# Patient Record
Sex: FEMALE | Race: WHITE | Employment: FULL TIME | URBAN - METROPOLITAN AREA
[De-identification: names, ages, dates, MRNs, and addresses within clinical notes are randomized per-mention and may not be internally consistent; named-entity substitution may affect disease eponyms.]

---

## 2017-02-09 ENCOUNTER — HOSPITAL ENCOUNTER (OUTPATIENT)
Dept: MAMMOGRAPHY | Age: 52
Discharge: HOME | End: 2017-02-09
Attending: FAMILY MEDICINE
Payer: COMMERCIAL

## 2017-02-09 DIAGNOSIS — Z12.31 VISIT FOR SCREENING MAMMOGRAM: ICD-10-CM

## 2017-02-09 PROCEDURE — 77067 SCR MAMMO BI INCL CAD: CPT

## 2018-02-20 ENCOUNTER — HOSPITAL ENCOUNTER (OUTPATIENT)
Dept: MAMMOGRAPHY | Age: 53
Discharge: HOME | End: 2018-02-20
Payer: COMMERCIAL

## 2018-02-20 DIAGNOSIS — Z12.31 VISIT FOR SCREENING MAMMOGRAM: ICD-10-CM

## 2018-02-20 PROCEDURE — 77067 SCR MAMMO BI INCL CAD: CPT

## 2019-02-22 ENCOUNTER — HOSPITAL ENCOUNTER (OUTPATIENT)
Dept: MAMMOGRAPHY | Age: 54
Discharge: HOME | End: 2019-02-22
Attending: FAMILY MEDICINE
Payer: COMMERCIAL

## 2019-02-22 DIAGNOSIS — Z12.31 VISIT FOR SCREENING MAMMOGRAM: ICD-10-CM

## 2019-02-22 PROCEDURE — 77067 SCR MAMMO BI INCL CAD: CPT

## 2021-03-12 ENCOUNTER — TRANSCRIBE ORDER (OUTPATIENT)
Dept: SCHEDULING | Age: 56
End: 2021-03-12

## 2021-03-12 DIAGNOSIS — Z12.31 SCREENING MAMMOGRAM FOR HIGH-RISK PATIENT: Primary | ICD-10-CM

## 2022-03-16 ENCOUNTER — TRANSCRIBE ORDER (OUTPATIENT)
Dept: SCHEDULING | Age: 57
End: 2022-03-16

## 2022-03-16 DIAGNOSIS — Z12.31 OTHER SCREENING MAMMOGRAM: Primary | ICD-10-CM

## 2022-04-13 ENCOUNTER — HOSPITAL ENCOUNTER (OUTPATIENT)
Dept: MAMMOGRAPHY | Age: 57
Discharge: HOME | End: 2022-04-13
Attending: FAMILY MEDICINE
Payer: COMMERCIAL

## 2022-04-13 DIAGNOSIS — Z12.31 OTHER SCREENING MAMMOGRAM: ICD-10-CM

## 2022-04-13 PROCEDURE — 77063 BREAST TOMOSYNTHESIS BI: CPT

## 2022-07-19 ENCOUNTER — OFFICE VISIT (OUTPATIENT)
Dept: OCCUPATIONAL MEDICINE | Age: 57
End: 2022-07-19

## 2022-07-19 VITALS — HEART RATE: 90 BPM | DIASTOLIC BLOOD PRESSURE: 64 MMHG | SYSTOLIC BLOOD PRESSURE: 118 MMHG

## 2022-07-19 DIAGNOSIS — Z01.30 BLOOD PRESSURE CHECK: Primary | ICD-10-CM

## 2022-07-19 PROBLEM — K21.00 GASTROESOPHAGEAL REFLUX DISEASE WITH ESOPHAGITIS WITHOUT HEMORRHAGE: Status: ACTIVE | Noted: 2022-07-19

## 2022-07-19 PROBLEM — E03.9 ACQUIRED HYPOTHYROIDISM: Status: ACTIVE | Noted: 2022-07-19

## 2022-07-19 PROBLEM — I10 PRIMARY HYPERTENSION: Status: ACTIVE | Noted: 2022-07-19

## 2022-07-19 PROBLEM — F32.9 MAJOR DEPRESSIVE DISORDER: Status: ACTIVE | Noted: 2022-07-19

## 2022-07-19 PROCEDURE — 99212 OFFICE O/P EST SF 10 MIN: CPT

## 2022-07-19 RX ORDER — LEVOTHYROXINE SODIUM 0.05 MG/1
50 TABLET ORAL DAILY
COMMUNITY

## 2022-07-19 RX ORDER — LISINOPRIL 20 MG/1
20 TABLET ORAL DAILY
COMMUNITY
End: 2022-07-21 | Stop reason: SDUPTHER

## 2022-07-19 RX ORDER — OMEPRAZOLE 20 MG/1
20 CAPSULE, DELAYED RELEASE ORAL DAILY
COMMUNITY

## 2022-07-19 RX ORDER — ESCITALOPRAM OXALATE 20 MG/1
20 TABLET ORAL DAILY
COMMUNITY
End: 2022-09-29

## 2022-07-19 NOTE — PROGRESS NOTES
PROGRESS NOTE    SUBJECTIVE:   King Hernandez is a 49 y.o. female seen for blood pressure check. Denies any other needs or concerns at this time. Current Outpatient Medications   Medication Sig Dispense Refill    omeprazole (PRILOSEC) 20 MG delayed release capsule Take 20 mg by mouth in the morning. lisinopril (PRINIVIL;ZESTRIL) 20 MG tablet Take 20 mg by mouth in the morning. levothyroxine (SYNTHROID) 50 MCG tablet Take 50 mcg by mouth in the morning. escitalopram (LEXAPRO) 20 MG tablet Take 20 mg by mouth in the morning. No current facility-administered medications for this visit. Allergies   Allergen Reactions    Bromfed Dm [[id number]] Other (See Comments)     jitters     Social History     Tobacco Use    Smoking status: Former     Types: Cigarettes    Smokeless tobacco: Not on file   Substance Use Topics    Alcohol use: Not Currently              OBJECTIVE:  /64 (Site: Left Upper Arm, Position: Sitting, Cuff Size: Large Adult)   Pulse 90          ASSESSMENT and PLAN    Diagnoses and all orders for this visit:    Blood pressure check    Keep next scheduled pcp appointment. Counseled on benefits of having a primary care provider which includes, but is not limited to, continuity of care and having a medical home when concerns arise. Also enforced that onsite clinic policy states that we are not to take the place of a primary care provider, pt verbalized understanding. SEs and risk vs benefits associated with medications prescribed discussed with patient who verbalized understanding. Pt verbalized understanding and agreement with plan of care. RTC for persisting/worsening symptoms or new complaints that arise.  Discussed signs and symptoms that would warrant immediate evaluation including, but not limited to HA, blurred vision, speech disturbance, difficulty with ambulation/gait, numbness, tingling, weakness, syncope, chest pain, or shortness of breath. I have reviewed the patient's medication list, past medical, family, social, and surgical history in detail and updated the patient record appropriately. No follow-up provider specified.   Ollie Clark

## 2022-07-21 ENCOUNTER — OFFICE VISIT (OUTPATIENT)
Dept: OCCUPATIONAL MEDICINE | Age: 57
End: 2022-07-21

## 2022-07-21 VITALS — HEART RATE: 96 BPM | SYSTOLIC BLOOD PRESSURE: 130 MMHG | OXYGEN SATURATION: 96 % | DIASTOLIC BLOOD PRESSURE: 80 MMHG

## 2022-07-21 DIAGNOSIS — I10 PRIMARY HYPERTENSION: Primary | ICD-10-CM

## 2022-07-21 DIAGNOSIS — Z76.0 MEDICATION REFILL: ICD-10-CM

## 2022-07-21 PROCEDURE — 99212 OFFICE O/P EST SF 10 MIN: CPT

## 2022-07-21 RX ORDER — LISINOPRIL 20 MG/1
20 TABLET ORAL DAILY
Qty: 90 TABLET | Refills: 0 | Status: SHIPPED | OUTPATIENT
Start: 2022-07-21 | End: 2022-10-19

## 2022-07-21 NOTE — PROGRESS NOTES
PROGRESS NOTE    SUBJECTIVE:   Mercedes Kraus is a 49 y.o. female seen for medication refill and blood pressure check. No complaints at this time. Current Outpatient Medications   Medication Sig Dispense Refill    lisinopril (PRINIVIL;ZESTRIL) 20 MG tablet Take 1 tablet by mouth in the morning. 90 tablet 0    omeprazole (PRILOSEC) 20 MG delayed release capsule Take 20 mg by mouth in the morning. levothyroxine (SYNTHROID) 50 MCG tablet Take 50 mcg by mouth in the morning. escitalopram (LEXAPRO) 20 MG tablet Take 20 mg by mouth in the morning. No current facility-administered medications for this visit. Allergies   Allergen Reactions    Bromfed Dm [[id number]] Other (See Comments)     jitters     Social History     Tobacco Use    Smoking status: Former     Types: Cigarettes    Smokeless tobacco: Not on file   Substance Use Topics    Alcohol use: Not Currently       Review of Systems       OBJECTIVE:  /80 (Site: Left Upper Arm, Position: Sitting, Cuff Size: Large Adult)   Pulse 96   SpO2 96%      Physical Exam    ASSESSMENT and PLAN    Diagnoses and all orders for this visit:    Primary hypertension  -     lisinopril (PRINIVIL;ZESTRIL) 20 MG tablet; Take 1 tablet by mouth in the morning. Medication refill  Keep appointment with your PCP. Counseled on benefits of having a primary care provider which includes, but is not limited to, continuity of care and having a medical home when concerns arise. Also enforced that onsite clinic policy states that we are not to take the place of a primary care provider, pt verbalized understanding. SEs and risk vs benefits associated with medications prescribed discussed with patient who verbalized understanding. Pt verbalized understanding and agreement with plan of care. RTC for persisting/worsening symptoms or new complaints that arise.  Discussed signs and symptoms that would warrant immediate evaluation including, but not limited to HA, blurred vision, speech disturbance, difficulty with ambulation/gait, numbness, tingling, weakness, syncope, chest pain, or shortness of breath. I have reviewed the patient's medication list, past medical, family, social, and surgical history in detail and updated the patient record appropriately. No follow-up provider specified.   Abram Pace

## 2022-09-06 ENCOUNTER — OFFICE VISIT (OUTPATIENT)
Dept: OCCUPATIONAL MEDICINE | Age: 57
End: 2022-09-06

## 2022-09-06 DIAGNOSIS — Z79.899 MEDICATION CHANGED TO THERAPEUTIC EQUIVALENT: ICD-10-CM

## 2022-09-06 RX ORDER — SERTRALINE HYDROCHLORIDE 25 MG/1
25 TABLET, FILM COATED ORAL DAILY
Qty: 30 TABLET | Refills: 0 | Status: SHIPPED | OUTPATIENT
Start: 2022-09-06 | End: 2022-09-29

## 2022-09-29 ENCOUNTER — OFFICE VISIT (OUTPATIENT)
Dept: OCCUPATIONAL MEDICINE | Age: 57
End: 2022-09-29

## 2022-09-29 VITALS — DIASTOLIC BLOOD PRESSURE: 72 MMHG | OXYGEN SATURATION: 97 % | HEART RATE: 97 BPM | SYSTOLIC BLOOD PRESSURE: 118 MMHG

## 2022-09-29 DIAGNOSIS — F32.A ANXIETY AND DEPRESSION: Primary | ICD-10-CM

## 2022-09-29 DIAGNOSIS — F41.9 ANXIETY AND DEPRESSION: Primary | ICD-10-CM

## 2022-09-29 ASSESSMENT — ANXIETY QUESTIONNAIRES
5. BEING SO RESTLESS THAT IT IS HARD TO SIT STILL: 2
2. NOT BEING ABLE TO STOP OR CONTROL WORRYING: 0
4. TROUBLE RELAXING: 2
GAD7 TOTAL SCORE: 8
1. FEELING NERVOUS, ANXIOUS, OR ON EDGE: 1
7. FEELING AFRAID AS IF SOMETHING AWFUL MIGHT HAPPEN: 0
3. WORRYING TOO MUCH ABOUT DIFFERENT THINGS: 0
6. BECOMING EASILY ANNOYED OR IRRITABLE: 3

## 2022-09-29 ASSESSMENT — PATIENT HEALTH QUESTIONNAIRE - PHQ9
SUM OF ALL RESPONSES TO PHQ QUESTIONS 1-9: 2
SUM OF ALL RESPONSES TO PHQ9 QUESTIONS 1 & 2: 2
2. FEELING DOWN, DEPRESSED OR HOPELESS: 0
1. LITTLE INTEREST OR PLEASURE IN DOING THINGS: 2
SUM OF ALL RESPONSES TO PHQ QUESTIONS 1-9: 2

## 2022-09-29 ASSESSMENT — ENCOUNTER SYMPTOMS: SHORTNESS OF BREATH: 0

## 2022-09-29 NOTE — PROGRESS NOTES
PROGRESS NOTE    SUBJECTIVE:   Anastacio Mendoza is a 49 y.o. female seen requesting that her sertraline be increased. She states that she still feels \"on edge at times at [facility]". Denies any SI/HI. Denies any complaints at this time. Anxiety  Presents for follow-up visit. Symptoms include irritability and restlessness. Patient reports no dry mouth, excessive worry or shortness of breath. Symptoms occur most days. The severity of symptoms is moderate. The quality of sleep is good. Nighttime awakenings: occasional.     Compliance with medications is %. Current Outpatient Medications   Medication Sig Dispense Refill    sertraline (ZOLOFT) 25 MG tablet Take 1 tablet by mouth daily 30 tablet 0    lisinopril (PRINIVIL;ZESTRIL) 20 MG tablet Take 1 tablet by mouth in the morning. 90 tablet 0    omeprazole (PRILOSEC) 20 MG delayed release capsule Take 20 mg by mouth in the morning. levothyroxine (SYNTHROID) 50 MCG tablet Take 50 mcg by mouth in the morning. escitalopram (LEXAPRO) 20 MG tablet Take 20 mg by mouth in the morning. No current facility-administered medications for this visit. Allergies   Allergen Reactions    Bromfed Dm [[id number]] Other (See Comments)     jitters     Social History     Tobacco Use    Smoking status: Former     Types: Cigarettes    Smokeless tobacco: Not on file   Substance Use Topics    Alcohol use: Not Currently       Review of Systems   Constitutional:  Positive for irritability. Respiratory:  Negative for shortness of breath. OBJECTIVE:  /72 (Site: Left Upper Arm, Position: Sitting, Cuff Size: Large Adult)   Pulse 97   SpO2 97%    PHQ-9 Total Score: 2 (9/29/2022  8:54 AM). ANNA MARIE 7 SCORE 9/29/2022   ANNA MARIE-7 Total Score 8     Interpretation of ANNA MARIE-7 score: 5-9 = mild anxiety, 10-14 = moderate anxiety, 15+ = severe anxiety. Recommend referral to behavioral health for scores 10 or greater.      ASSESSMENT and PLAN    Diagnoses and all orders for this visit:    Anxiety and depression    Increase patients dose of Sertraline and will reassess in one to two weeks. Follow up as needed or in 1-2 weeks for evaluation. Counseled on benefits of having a primary care provider which includes, but is not limited to, continuity of care and having a medical home when concerns arise. Also enforced that onsite clinic policy states that we are not to take the place of a primary care provider, pt verbalized understanding. SEs and risk vs benefits associated with medications prescribed discussed with patient who verbalized understanding. Pt verbalized understanding and agreement with plan of care. RTC for persisting/worsening symptoms or new complaints that arise. Discussed signs and symptoms that would warrant immediate evaluation including, but not limited to HA, blurred vision, speech disturbance, difficulty with ambulation/gait, numbness, tingling, weakness, syncope, chest pain, or shortness of breath. I have reviewed the patient's medication list, past medical, family, social, and surgical history in detail and updated the patient record appropriately. No follow-up provider specified.     Jesse Shin NP

## 2022-10-04 ENCOUNTER — OFFICE VISIT (OUTPATIENT)
Dept: OCCUPATIONAL MEDICINE | Age: 57
End: 2022-10-04

## 2022-10-04 VITALS — HEART RATE: 97 BPM | OXYGEN SATURATION: 97 % | TEMPERATURE: 98.1 F

## 2022-10-04 DIAGNOSIS — H65.01 NON-RECURRENT ACUTE SEROUS OTITIS MEDIA OF RIGHT EAR: ICD-10-CM

## 2022-10-04 DIAGNOSIS — H65.93 MIDDLE EAR EFFUSION, BILATERAL: Primary | ICD-10-CM

## 2022-10-04 ASSESSMENT — ENCOUNTER SYMPTOMS
DIARRHEA: 0
SHORTNESS OF BREATH: 0
RHINORRHEA: 0
SINUS PAIN: 0
WHEEZING: 0
APNEA: 0
SINUS PRESSURE: 0
SORE THROAT: 0
VOMITING: 0

## 2022-10-04 NOTE — PROGRESS NOTES
PROGRESS NOTE    SUBJECTIVE:   Shamar Mejia is a 49 y.o. female seen for ear pain/ ear pressure in right ear that has been occurring for the last two days. States she feels pain most when laying down. She has tried zyrtec to help with allergies. Denies any other otc medications at this time. Denies fever, h/a, sore throat, N/V/D, fatigue, or SOB. Otalgia   There is pain in the right ear. This is a new problem. The current episode started in the past 7 days. The problem occurs every few hours. The problem has been unchanged. There has been no fever. The pain is mild. Pertinent negatives include no diarrhea, ear discharge, headaches, hearing loss, neck pain, rash, rhinorrhea, sore throat or vomiting. Treatments tried: antihistamine. The treatment provided no relief. There is no history of a chronic ear infection, hearing loss or a tympanostomy tube. 10/6/22- Patient seen today for increased pain (8/10) in right ear that extends . States that she could not afford the other medication that was ordered. She tried otc ear pain ease medication last night but states that it did not help. She ask for recheck of ear. Denies fever, SOB, headache. She is currently experiencing pain and nausea. Current Outpatient Medications   Medication Sig Dispense Refill    amoxicillin (AMOXIL) 500 MG capsule Take 1 capsule by mouth 2 times daily for 7 days 14 capsule 0    hydrOXYzine HCl (ATARAX) 25 MG tablet Take 1 tablet by mouth every 8 hours as needed for Itching 30 tablet 0    ipratropium (ATROVENT HFA) 17 MCG/ACT inhaler Inhale 1 puff into the lungs 3 times daily 1 each 3    sertraline (ZOLOFT) 50 MG tablet Take 1 tablet by mouth daily 90 tablet 1    lisinopril (PRINIVIL;ZESTRIL) 20 MG tablet Take 1 tablet by mouth in the morning. 90 tablet 0    omeprazole (PRILOSEC) 20 MG delayed release capsule Take 20 mg by mouth in the morning.       levothyroxine (SYNTHROID) 50 MCG tablet Take 50 mcg by mouth in the morning. No current facility-administered medications for this visit. Allergies   Allergen Reactions    Bromfed Dm [[id number]] Other (See Comments)     jitters     Social History     Tobacco Use    Smoking status: Former     Types: Cigarettes    Smokeless tobacco: Not on file   Substance Use Topics    Alcohol use: Not Currently       Review of Systems   Constitutional:  Negative for fatigue and fever. HENT:  Positive for ear pain. Negative for ear discharge, hearing loss, rhinorrhea, sinus pressure, sinus pain, sneezing and sore throat. Respiratory:  Negative for apnea, shortness of breath and wheezing. Gastrointestinal:  Negative for diarrhea and vomiting. Musculoskeletal:  Negative for neck pain. Skin:  Negative for rash. Allergic/Immunologic: Positive for environmental allergies. Negative for immunocompromised state. Neurological:  Negative for dizziness, light-headedness, numbness and headaches. OBJECTIVE:  Pulse 97   Temp 98.1 °F (36.7 °C)   SpO2 97%      Physical Exam  Constitutional:       Appearance: Normal appearance. She is well-developed and well-groomed. She is not ill-appearing. HENT:      Head: Normocephalic. Right Ear: No tenderness. Tympanic membrane is erythematous and bulging. Tympanic membrane is not injected or retracted. Left Ear: No tenderness. A middle ear effusion is present. Tympanic membrane is not injected, erythematous, retracted or bulging. Nose: Nose normal.      Right Turbinates: Not enlarged or swollen. Left Turbinates: Swollen. Mouth/Throat:      Mouth: Mucous membranes are moist.      Pharynx: No oropharyngeal exudate or posterior oropharyngeal erythema. Cardiovascular:      Rate and Rhythm: Normal rate and regular rhythm. Pulmonary:      Effort: Pulmonary effort is normal.      Breath sounds: Normal breath sounds and air entry.    Lymphadenopathy:      Head:      Right side of head: Submental and submandibular adenopathy present. No tonsillar or preauricular adenopathy. Left side of head: No submental, submandibular, tonsillar or preauricular adenopathy. Cervical:      Right cervical: No superficial cervical adenopathy. Left cervical: No superficial cervical adenopathy. Upper Body:      Right upper body: No supraclavicular adenopathy. Left upper body: No supraclavicular adenopathy. Skin:     General: Skin is warm and dry. Neurological:      Mental Status: She is alert and oriented to person, place, and time. Psychiatric:         Mood and Affect: Mood normal.         Behavior: Behavior normal. Behavior is cooperative. ASSESSMENT and PLAN    Martina Brower was seen today for otalgia. Diagnoses and all orders for this visit:    Middle ear effusion, bilateral  -     ipratropium (ATROVENT HFA) 17 MCG/ACT inhaler; Inhale 1 puff into the lungs 3 times daily  -     hydrOXYzine HCl (ATARAX) 25 MG tablet; Take 1 tablet by mouth every 8 hours as needed for Itching    Non-recurrent acute serous otitis media of right ear  -     amoxicillin (AMOXIL) 500 MG capsule; Take 1 capsule by mouth 2 times daily for 7 days      ADDEND 10/6/22    Tiarra Mcleod all of the prescribed medication even if you start to feel better. ONLY take the atarax when you will be at home, not driving or not operating heavy machinery. Since Atrovent was unattainable, continue to use Flonase daily. May use warm compress on ear to ease pain. May also use ibuprofen 600 mg 3x a day for pain control. Return to clinic next week for recheck. -----------------------------------------------------------------------------------------------------------------------------------------------------------------------------------------------------------------------------------------------------------  Advised patient to use Atrovent and Flonase and continue using antihistamine of choice 2nd generation.    Discussed need to avoid/limit exposure to allergens. Education: Change air filter monthly. Wear mask while doing yard work. Keep pets off bed during bedtime hours. Vacuum carpet weekly. Leave windows closed. Use Nasal spray after showering. Follow in 2 weeks with PCP or here if symptoms not any better or worse. Patient voices understanding and agrees with the plan of care described above    Counseled on benefits of having a primary care provider which includes, but is not limited to, continuity of care and having a medical home when concerns arise. Also enforced that onsite clinic policy states that we are not to take the place of a primary care provider, pt verbalized understanding. SEs and risk vs benefits associated with medications prescribed discussed with patient who verbalized understanding. Pt verbalized understanding and agreement with plan of care. RTC for persisting/worsening symptoms or new complaints that arise. Discussed signs and symptoms that would warrant immediate evaluation including, but not limited to HA, blurred vision, speech disturbance, difficulty with ambulation/gait, numbness, tingling, weakness, syncope, chest pain, or shortness of breath. I have reviewed the patient's medication list, past medical, family, social, and surgical history in detail and updated the patient record appropriately. No follow-up provider specified.     Shaan Hill NP

## 2022-10-06 RX ORDER — AMOXICILLIN 500 MG/1
500 CAPSULE ORAL 2 TIMES DAILY
Qty: 14 CAPSULE | Refills: 0 | Status: SHIPPED | OUTPATIENT
Start: 2022-10-06 | End: 2022-10-13

## 2022-10-06 RX ORDER — HYDROXYZINE HYDROCHLORIDE 25 MG/1
25 TABLET, FILM COATED ORAL EVERY 8 HOURS PRN
Qty: 30 TABLET | Refills: 0 | Status: SHIPPED | OUTPATIENT
Start: 2022-10-06 | End: 2022-11-05

## 2022-10-06 ASSESSMENT — ENCOUNTER SYMPTOMS
WHEEZING: 0
SINUS PAIN: 0
RHINORRHEA: 0
SHORTNESS OF BREATH: 0
SINUS PRESSURE: 0
VOMITING: 0
SORE THROAT: 0
DIARRHEA: 0
APNEA: 0

## 2022-10-18 DIAGNOSIS — Z71.6 ENCOUNTER FOR SMOKING CESSATION COUNSELING: Primary | ICD-10-CM

## 2022-10-18 RX ORDER — VARENICLINE TARTRATE 1 MG/1
TABLET, FILM COATED ORAL
COMMUNITY
Start: 2022-08-20 | End: 2022-10-18 | Stop reason: SDUPTHER

## 2022-10-18 RX ORDER — VARENICLINE TARTRATE 1 MG/1
1 TABLET, FILM COATED ORAL DAILY
Qty: 60 TABLET | Refills: 0 | Status: SHIPPED | OUTPATIENT
Start: 2022-10-18

## 2022-10-18 NOTE — TELEPHONE ENCOUNTER
Patient states that she is having a difficult time trying not to smoke due to stressful family situation at this time. She is requesting that her varenicline be refilled. She denies ever having side effects from this medication in past. Denies suicidal/homicidal ideations current or past. Denies current or hx of seizures. Instructed to RTC weekly for reevaluation.

## 2022-11-22 ENCOUNTER — OFFICE VISIT (OUTPATIENT)
Dept: OCCUPATIONAL MEDICINE | Age: 57
End: 2022-11-22

## 2022-11-22 DIAGNOSIS — K21.00 GASTROESOPHAGEAL REFLUX DISEASE WITH ESOPHAGITIS WITHOUT HEMORRHAGE: ICD-10-CM

## 2022-11-22 DIAGNOSIS — Z76.0 MEDICATION REFILL: Primary | ICD-10-CM

## 2022-11-22 RX ORDER — OMEPRAZOLE 20 MG/1
20 CAPSULE, DELAYED RELEASE ORAL DAILY
Qty: 90 CAPSULE | Refills: 1 | Status: SHIPPED | OUTPATIENT
Start: 2022-11-22

## 2022-11-22 ASSESSMENT — ENCOUNTER SYMPTOMS
CHOKING: 1
BELCHING: 1
COUGH: 1
HEARTBURN: 1

## 2022-11-22 NOTE — PROGRESS NOTES
PROGRESS NOTE    SUBJECTIVE:   Jerry Huang is a 49 y.o. female seen for medication refill for her omeprazole. She was supposed to have MD appointment last week but had to cancel due to death in family. Denies any other needs at this time. Gastroesophageal Reflux  She complains of belching, choking, coughing and heartburn. This is a chronic problem. The current episode started more than 1 year ago. The problem occurs frequently. The heartburn is located in the abdomen. The heartburn is of mild intensity. The heartburn does not wake her from sleep. The heartburn does not limit her activity. The heartburn doesn't change with position. The symptoms are aggravated by certain foods, bending, stress and lying down. Pertinent negatives include no anemia, fatigue, melena, muscle weakness, orthopnea or weight loss. Risk factors include lack of exercise, obesity and smoking/tobacco exposure. She has tried an antacid, head elevation and a PPI for the symptoms. The treatment provided moderate relief. Past procedures do not include an abdominal ultrasound, an EGD, esophageal manometry, esophageal pH monitoring, H. pylori antibody titer or a UGI. Past invasive treatments do not include gastroplasty, gastroplication or reflux surgery. Current Outpatient Medications   Medication Sig Dispense Refill    omeprazole (PRILOSEC) 20 MG delayed release capsule Take 1 capsule by mouth daily 90 capsule 1    varenicline (CHANTIX) 1 MG tablet Take 1 tablet by mouth daily 60 tablet 0    ipratropium (ATROVENT HFA) 17 MCG/ACT inhaler Inhale 1 puff into the lungs 3 times daily 1 each 3    sertraline (ZOLOFT) 50 MG tablet Take 1 tablet by mouth daily 90 tablet 1    lisinopril (PRINIVIL;ZESTRIL) 20 MG tablet Take 1 tablet by mouth in the morning. 90 tablet 0    levothyroxine (SYNTHROID) 50 MCG tablet Take 50 mcg by mouth in the morning. No current facility-administered medications for this visit.      Allergies   Allergen Reactions    Bromfed Dm [[id number]] Other (See Comments)     jitters     Social History     Tobacco Use    Smoking status: Former     Types: Cigarettes    Smokeless tobacco: Not on file   Substance Use Topics    Alcohol use: Not Currently       Review of Systems   Constitutional: Negative. Negative for fatigue and weight loss. HENT: Negative. Respiratory:  Positive for cough and choking. Gastrointestinal:  Positive for heartburn. Negative for melena. Musculoskeletal: Negative. Negative for muscle weakness. Skin: Negative. Allergic/Immunologic: Positive for environmental allergies. Neurological: Negative. Hematological: Negative. Physical Exam  Constitutional:       Appearance: Normal appearance. She is well-developed and well-groomed. She is not ill-appearing. HENT:      Head: Normocephalic. Pulmonary:      Effort: Pulmonary effort is normal.   Abdominal:      General: Abdomen is flat. Bowel sounds are normal. There is no distension. Palpations: Abdomen is soft. There is no mass. Tenderness: There is no abdominal tenderness. There is no guarding or rebound. Hernia: No hernia is present. Skin:     General: Skin is warm and dry. Neurological:      Mental Status: She is alert and oriented to person, place, and time. Psychiatric:         Mood and Affect: Mood normal.         Behavior: Behavior normal. Behavior is cooperative. ASSESSMENT and PLAN    Diagnoses and all orders for this visit:    Medication refill    Gastroesophageal reflux disease with esophagitis without hemorrhage  -     omeprazole (PRILOSEC) 20 MG delayed release capsule; Take 1 capsule by mouth daily  Side effects and risks vs benefits associated with medication discussed with pt and pt reports understanding.   - Continue all meds as prescribed. Keep all apt with PCP.     -RTC as needed for complaints or concerns that may arise    Counseled on benefits of having a primary care provider which includes, but is not limited to, continuity of care and having a medical home when concerns arise. Also enforced that onsite clinic policy states that we are not to take the place of a primary care provider, pt verbalized understanding. SEs and risk vs benefits associated with medications prescribed discussed with patient who verbalized understanding. Pt verbalized understanding and agreement with plan of care. RTC for persisting/worsening symptoms or new complaints that arise. Discussed signs and symptoms that would warrant immediate evaluation including, but not limited to HA, blurred vision, speech disturbance, difficulty with ambulation/gait, numbness, tingling, weakness, syncope, chest pain, or shortness of breath. I have reviewed the patient's medication list, past medical, family, social, and surgical history in detail and updated the patient record appropriately. No follow-up provider specified.     Cece Briggs NP

## 2022-12-15 ENCOUNTER — OFFICE VISIT (OUTPATIENT)
Dept: OCCUPATIONAL MEDICINE | Age: 57
End: 2022-12-15

## 2022-12-15 DIAGNOSIS — Z71.6 ENCOUNTER FOR SMOKING CESSATION COUNSELING: ICD-10-CM

## 2022-12-15 DIAGNOSIS — I10 HYPERTENSION, UNSPECIFIED TYPE: ICD-10-CM

## 2022-12-15 DIAGNOSIS — Z76.0 ENCOUNTER FOR MEDICATION REFILL: Primary | ICD-10-CM

## 2022-12-15 RX ORDER — VARENICLINE TARTRATE 1 MG/1
1 TABLET, FILM COATED ORAL DAILY
Qty: 60 TABLET | Refills: 0 | Status: SHIPPED | OUTPATIENT
Start: 2022-12-15

## 2022-12-15 RX ORDER — HYDROCHLOROTHIAZIDE 25 MG/1
TABLET ORAL
COMMUNITY
Start: 2022-09-24 | End: 2022-12-15 | Stop reason: SDUPTHER

## 2022-12-15 RX ORDER — HYDROCHLOROTHIAZIDE 25 MG/1
25 TABLET ORAL DAILY
Qty: 30 TABLET | Refills: 1 | Status: SHIPPED | OUTPATIENT
Start: 2022-12-15

## 2022-12-15 NOTE — PROGRESS NOTES
PROGRESS NOTE    SUBJECTIVE:   Demetrio Nielsen is a 49 y.o. female seen for medication refill. Pt presents for a refill for Chantix for the expanded dosing - not the initial dosing. Reports that she feels if the dosing is stronger she can actually quit. Denies any relief with nicotine patches. Denies suicidal/homicidal ideations current or past. Denies current or hx of seizures. States that she has smoked for years and needs help to quit and to stay quit. Patient is also in need of her hydrochlorothiazide refill. States that she will return next week for labs. Current Outpatient Medications   Medication Sig Dispense Refill    hydroCHLOROthiazide (HYDRODIURIL) 25 MG tablet       omeprazole (PRILOSEC) 20 MG delayed release capsule Take 1 capsule by mouth daily 90 capsule 1    varenicline (CHANTIX) 1 MG tablet Take 1 tablet by mouth daily 60 tablet 0    ipratropium (ATROVENT HFA) 17 MCG/ACT inhaler Inhale 1 puff into the lungs 3 times daily 1 each 3    sertraline (ZOLOFT) 50 MG tablet Take 1 tablet by mouth daily 90 tablet 1    lisinopril (PRINIVIL;ZESTRIL) 20 MG tablet Take 1 tablet by mouth in the morning. 90 tablet 0    levothyroxine (SYNTHROID) 50 MCG tablet Take 50 mcg by mouth in the morning. No current facility-administered medications for this visit. Allergies   Allergen Reactions    Bromfed Dm [[id number]] Other (See Comments)     jitters     Social History     Tobacco Use    Smoking status: Former     Types: Cigarettes    Smokeless tobacco: Not on file   Substance Use Topics    Alcohol use: Not Currently     Past Medical History:   Diagnosis Date    Menopause     @51      No past surgical history on file. ASSESSMENT and PLAN    There are no diagnoses linked to this encounter. O:  Pt A&Ox4, NAD, Pleasant, calm engaging in conversation. Appropriate mood and affect for situation.  TTM stage of change: action  A: Smoking Cessation   Encounter for health education  P: Discussed options for smoking cessation. Varenicline 1mg take 1 po BID daily; disp 60; 1RF. Discussed with patient that it may take as long as 16 weeks of medication for  smoking cessation success. Discussed reasons to seek emergency medical attention to include: CP/SOB/ homicidal or suicidal ideations. Patient verbalized understanding  Discussed risks vs benefits of medication as well as side effects to include changes in mood, insomnia, hallucinations, dry mouth, and headache, etc.  Discussed the need for behavioral management counseling along with medication for best success. Instructed to RTC weekly for reevaluation. Will return next week for labs. Counseled on benefits of having a primary care provider which includes, but is not limited to, continuity of care and having a medical home when concerns arise. Also enforced that onsite clinic policy states that we are not to take the place of a primary care provider, pt verbalized understanding. SEs and risk vs benefits associated with medications prescribed discussed with patient who verbalized understanding. Pt verbalized understanding and agreement with plan of care. RTC for persisting/worsening symptoms or new complaints that arise. Discussed signs and symptoms that would warrant immediate evaluation including, but not limited to HA, blurred vision, speech disturbance, difficulty with ambulation/gait, numbness, tingling, weakness, syncope, chest pain, or shortness of breath. I have reviewed the patient's medication list, past medical, family, social, and surgical history in detail and updated the patient record appropriately. No follow-up provider specified.     Lorinda Leyden NP

## 2023-01-03 ENCOUNTER — OFFICE VISIT (OUTPATIENT)
Dept: OCCUPATIONAL MEDICINE | Age: 58
End: 2023-01-03

## 2023-01-03 VITALS — HEART RATE: 123 BPM | RESPIRATION RATE: 20 BRPM | TEMPERATURE: 99.1 F | OXYGEN SATURATION: 95 %

## 2023-01-03 DIAGNOSIS — R11.0 NAUSEA: Primary | ICD-10-CM

## 2023-01-03 DIAGNOSIS — J06.9 UPPER RESPIRATORY TRACT INFECTION, UNSPECIFIED TYPE: ICD-10-CM

## 2023-01-03 RX ORDER — ALBUTEROL SULFATE 90 UG/1
AEROSOL, METERED RESPIRATORY (INHALATION)
COMMUNITY
Start: 2022-12-27

## 2023-01-03 RX ORDER — DOXYCYCLINE HYCLATE 100 MG/1
100 CAPSULE ORAL 2 TIMES DAILY
Qty: 14 CAPSULE | Refills: 0 | Status: SHIPPED | OUTPATIENT
Start: 2023-01-03 | End: 2023-01-10

## 2023-01-03 RX ORDER — PROMETHAZINE HYDROCHLORIDE 12.5 MG/1
12.5 TABLET ORAL 4 TIMES DAILY PRN
Qty: 20 TABLET | Refills: 0 | Status: SHIPPED | OUTPATIENT
Start: 2023-01-03 | End: 2023-01-10

## 2023-01-03 ASSESSMENT — ENCOUNTER SYMPTOMS
NAUSEA: 1
SINUS PRESSURE: 0
SINUS PAIN: 0
DIARRHEA: 0
SORE THROAT: 0
COUGH: 1
CHOKING: 1
WHEEZING: 1
APNEA: 0
SHORTNESS OF BREATH: 1

## 2023-01-03 NOTE — PROGRESS NOTES
PROGRESS NOTE    SUBJECTIVE:   Hansel Franklin is a 56 y.o. female seen for uri. States that she was experiencing cough (productive) and fever before [place]. She states that she was around family that have the same symptoms. She was seen by her pcp on December 27 and diagnosed with bronchitis and given a zpack, steroids, Cheratussin, and inhaler. She states that she is now worse and that the inhaler makes her cough so she only uses the inhaler once a day. She states that her ribs hurt from coughing and that she is now experiencing nausea without emesis and feels short of breath. Denies testing for Covid or flu. URI   This is a new problem. The current episode started 1 to 4 weeks ago. The problem has been gradually worsening. There has been no fever. The fever has been present for 1 to 2 days. Associated symptoms include congestion, coughing, headaches, nausea and wheezing. Pertinent negatives include no diarrhea, sinus pain or sore throat. She has tried inhaler use and sleep for the symptoms. The treatment provided mild relief. Current Outpatient Medications   Medication Sig Dispense Refill    albuterol sulfate HFA (PROVENTIL;VENTOLIN;PROAIR) 108 (90 Base) MCG/ACT inhaler INHALE TWO PUFFS BY MOUTH EVERY 4 HOURS AS NEEDED FOR WHEEZING      hydroCHLOROthiazide (HYDRODIURIL) 25 MG tablet Take 1 tablet by mouth daily 30 tablet 1    omeprazole (PRILOSEC) 20 MG delayed release capsule Take 1 capsule by mouth daily 90 capsule 1    sertraline (ZOLOFT) 50 MG tablet Take 1 tablet by mouth daily 90 tablet 1    levothyroxine (SYNTHROID) 50 MCG tablet Take 50 mcg by mouth in the morning.       promethazine (PHENERGAN) 12.5 MG tablet Take 1 tablet by mouth 4 times daily as needed for Nausea 20 tablet 0    doxycycline hyclate (VIBRAMYCIN) 100 MG capsule Take 1 capsule by mouth 2 times daily for 7 days 14 capsule 0    varenicline (CHANTIX) 1 MG tablet Take 1 tablet by mouth daily 60 tablet 0    ipratropium (ATROVENT HFA) 17 MCG/ACT inhaler Inhale 1 puff into the lungs 3 times daily (Patient not taking: Reported on 1/3/2023) 1 each 3    lisinopril (PRINIVIL;ZESTRIL) 20 MG tablet Take 1 tablet by mouth in the morning. 90 tablet 0     No current facility-administered medications for this visit. Allergies   Allergen Reactions    Bromfed Dm [[id number]] Other (See Comments)     jitters     Social History     Tobacco Use    Smoking status: Former     Types: Cigarettes    Smokeless tobacco: Not on file   Substance Use Topics    Alcohol use: Not Currently       Review of Systems   Constitutional:  Positive for fatigue and fever. HENT:  Positive for congestion. Negative for sinus pressure, sinus pain and sore throat. Respiratory:  Positive for cough, choking, shortness of breath and wheezing. Negative for apnea. Gastrointestinal:  Positive for nausea. Negative for diarrhea. Neurological:  Positive for headaches. OBJECTIVE:  Pulse (!) 123   Temp 99.1 °F (37.3 °C)   Resp 20   SpO2 95%      Physical Exam  Constitutional:       General: She is not in acute distress. Appearance: Normal appearance. She is well-developed and well-groomed. She is ill-appearing. She is not toxic-appearing. HENT:      Head: Normocephalic. Mouth/Throat:      Lips: Pink. Mouth: Mucous membranes are moist. No injury or oral lesions. Tongue: No lesions. Tongue does not deviate from midline. Palate: No mass and lesions. Pharynx: Uvula midline. Posterior oropharyngeal erythema present. No oropharyngeal exudate. Cardiovascular:      Rate and Rhythm: Tachycardia present. Pulses: Normal pulses. Heart sounds: Normal heart sounds, S1 normal and S2 normal. No murmur heard. No friction rub. No gallop. No S3 or S4 sounds. Pulmonary:      Effort: Prolonged expiration present. No respiratory distress. Breath sounds: Decreased air movement and transmitted upper airway sounds present. No stridor. Examination of the right-upper field reveals decreased breath sounds and wheezing. Examination of the left-upper field reveals decreased breath sounds and wheezing. Examination of the right-middle field reveals decreased breath sounds and wheezing. Examination of the left-middle field reveals wheezing. Examination of the right-lower field reveals rhonchi. Examination of the left-lower field reveals rhonchi. Decreased breath sounds, wheezing and rhonchi present. Abdominal:      General: Abdomen is flat. There is no distension. Palpations: Abdomen is soft. There is no mass. Tenderness: There is no abdominal tenderness. There is no guarding. Skin:     General: Skin is warm and dry. Neurological:      Mental Status: She is alert and oriented to person, place, and time. Psychiatric:         Mood and Affect: Mood normal.         Behavior: Behavior normal. Behavior is cooperative. ASSESSMENT and PLAN    Xiao Higgins was seen today for uri. Diagnoses and all orders for this visit:    Nausea  -     promethazine (PHENERGAN) 12.5 MG tablet; Take 1 tablet by mouth 4 times daily as needed for Nausea    Upper respiratory tract infection, unspecified type  -     doxycycline hyclate (VIBRAMYCIN) 100 MG capsule; Take 1 capsule by mouth 2 times daily for 7 days    Discussed eating small meals, increasing fluid intake, rest,  and taking all of prescribed medications. Discussed going to urgent care or ER if SOB increases for breathing treatment  Discussed the importance of using inhaler when needed to decrease wheezing. She was given work excuse note for next 72 hours and is not allowed to come back to work unless she is fever free for 24 hours without using tylenol. Patient agreed to plan of care. Counseled on benefits of having a primary care provider which includes, but is not limited to, continuity of care and having a medical home when concerns arise.  Also enforced that onsite clinic policy states that we are not to take the place of a primary care provider, pt verbalized understanding. SEs and risk vs benefits associated with medications prescribed discussed with patient who verbalized understanding. Pt verbalized understanding and agreement with plan of care. RTC for persisting/worsening symptoms or new complaints that arise. Discussed signs and symptoms that would warrant immediate evaluation including, but not limited to HA, blurred vision, speech disturbance, difficulty with ambulation/gait, numbness, tingling, weakness, syncope, chest pain, or shortness of breath. I have reviewed the patient's medication list, past medical, family, social, and surgical history in detail and updated the patient record appropriately. No follow-up provider specified.     Shawanda Sneed NP

## 2023-01-24 ENCOUNTER — OFFICE VISIT (OUTPATIENT)
Dept: OCCUPATIONAL MEDICINE | Age: 58
End: 2023-01-24

## 2023-01-24 VITALS
SYSTOLIC BLOOD PRESSURE: 118 MMHG | DIASTOLIC BLOOD PRESSURE: 82 MMHG | RESPIRATION RATE: 18 BRPM | OXYGEN SATURATION: 97 % | HEART RATE: 105 BPM

## 2023-01-24 DIAGNOSIS — E03.9 HYPOTHYROIDISM, UNSPECIFIED TYPE: ICD-10-CM

## 2023-01-24 DIAGNOSIS — J06.9 UPPER RESPIRATORY TRACT INFECTION, UNSPECIFIED TYPE: Primary | ICD-10-CM

## 2023-01-24 DIAGNOSIS — F32.A ANXIETY AND DEPRESSION: ICD-10-CM

## 2023-01-24 DIAGNOSIS — F41.9 ANXIETY AND DEPRESSION: ICD-10-CM

## 2023-01-24 PROBLEM — E03.4 HYPOTHYROIDISM DUE TO ACQUIRED ATROPHY OF THYROID: Status: ACTIVE | Noted: 2021-04-12

## 2023-01-24 PROBLEM — E78.2 MIXED HYPERLIPIDEMIA: Status: ACTIVE | Noted: 2021-04-12

## 2023-01-24 PROBLEM — M17.12 ARTHRITIS OF KNEE, LEFT: Status: ACTIVE | Noted: 2023-01-24

## 2023-01-24 RX ORDER — SERTRALINE HYDROCHLORIDE 25 MG/1
25 TABLET, FILM COATED ORAL DAILY
Qty: 30 TABLET | Refills: 0 | Status: SHIPPED | OUTPATIENT
Start: 2023-01-24

## 2023-01-24 RX ORDER — LEVOTHYROXINE SODIUM 0.05 MG/1
50 TABLET ORAL DAILY
Qty: 30 TABLET | Refills: 3 | Status: SHIPPED | OUTPATIENT
Start: 2023-01-24

## 2023-01-24 RX ORDER — LEVOFLOXACIN 500 MG/1
500 TABLET, FILM COATED ORAL DAILY
Qty: 7 TABLET | Refills: 0 | Status: SHIPPED | OUTPATIENT
Start: 2023-01-24 | End: 2023-01-31

## 2023-01-24 ASSESSMENT — ENCOUNTER SYMPTOMS
SHORTNESS OF BREATH: 1
SINUS PAIN: 0
COUGH: 1
FACIAL SWELLING: 1
SINUS PRESSURE: 0
SORE THROAT: 0
WHEEZING: 1

## 2023-01-24 NOTE — PROGRESS NOTES
PROGRESS NOTE    SUBJECTIVE:   Anuj Nicole is a 56 y.o. female seen for coughing, wheezing, SOB x 5 weeks. She has had 2 rounds of antibiotics during that time with minimal improvement, she is using her inhaler 2x a day when needed. Denies N/V/D, headache, ear pain/pressure. Cough  This is a recurrent problem. The current episode started 1 to 4 weeks ago. The problem has been unchanged. The problem occurs hourly. The cough is Productive of sputum. Associated symptoms include a fever, nasal congestion, postnasal drip, shortness of breath and wheezing. Pertinent negatives include no chills, ear congestion, ear pain, headaches, sore throat, sweats or weight loss. The symptoms are aggravated by lying down. Risk factors for lung disease include occupational exposure and smoking/tobacco exposure. She has tried oral steroids, ipratropium inhaler, prescription cough suppressant, OTC cough suppressant and body position changes for the symptoms. The treatment provided mild relief. Her past medical history is significant for bronchitis.      Current Outpatient Medications   Medication Sig Dispense Refill    levothyroxine (SYNTHROID) 50 MCG tablet Take 1 tablet by mouth Daily 30 tablet 3    sertraline (ZOLOFT) 25 MG tablet Take 1 tablet by mouth daily 30 tablet 0    levoFLOXacin (LEVAQUIN) 500 MG tablet Take 1 tablet by mouth daily for 7 days 7 tablet 0    albuterol sulfate HFA (PROVENTIL;VENTOLIN;PROAIR) 108 (90 Base) MCG/ACT inhaler INHALE TWO PUFFS BY MOUTH EVERY 4 HOURS AS NEEDED FOR WHEEZING      hydroCHLOROthiazide (HYDRODIURIL) 25 MG tablet Take 1 tablet by mouth daily 30 tablet 1    varenicline (CHANTIX) 1 MG tablet Take 1 tablet by mouth daily 60 tablet 0    omeprazole (PRILOSEC) 20 MG delayed release capsule Take 1 capsule by mouth daily 90 capsule 1    ipratropium (ATROVENT HFA) 17 MCG/ACT inhaler Inhale 1 puff into the lungs 3 times daily (Patient not taking: Reported on 1/3/2023) 1 each 3    sertraline (ZOLOFT) 50 MG tablet Take 1 tablet by mouth daily 90 tablet 1    lisinopril (PRINIVIL;ZESTRIL) 20 MG tablet Take 1 tablet by mouth in the morning. 90 tablet 0     No current facility-administered medications for this visit. Allergies   Allergen Reactions    Bromfed Dm [[id number]] Other (See Comments)     jitters     Social History     Tobacco Use    Smoking status: Former     Types: Cigarettes    Smokeless tobacco: Not on file   Substance Use Topics    Alcohol use: Not Currently       Review of Systems   Constitutional:  Positive for fever. Negative for chills and weight loss. HENT:  Positive for facial swelling and postnasal drip. Negative for ear pain, sinus pressure, sinus pain, sore throat and tinnitus. Respiratory:  Positive for cough, shortness of breath and wheezing. Neurological:  Negative for headaches. Hematological: Negative. Psychiatric/Behavioral: Negative. Past Medical History:   Diagnosis Date    Menopause     @51    No past surgical history on file. OBJECTIVE:  /82 (Site: Left Upper Arm, Position: Sitting, Cuff Size: Large Adult)   Pulse (!) 105   Resp 18   SpO2 97%      Physical Exam  Constitutional:       Appearance: Normal appearance. She is well-developed and well-groomed. She is ill-appearing. HENT:      Head: Normocephalic. Mouth/Throat:      Lips: Pink. Cardiovascular:      Rate and Rhythm: Tachycardia present. Pulses: Normal pulses. Pulmonary:      Effort: Pulmonary effort is normal. No tachypnea, bradypnea, accessory muscle usage, respiratory distress or retractions. Breath sounds: Decreased air movement and transmitted upper airway sounds present. Examination of the right-upper field reveals decreased breath sounds and wheezing. Examination of the left-upper field reveals decreased breath sounds and wheezing. Examination of the right-middle field reveals wheezing.  Examination of the left-middle field reveals wheezing and rhonchi. Examination of the right-lower field reveals wheezing and rhonchi. Examination of the left-lower field reveals wheezing and rhonchi. Decreased breath sounds, wheezing and rhonchi present. Skin:     General: Skin is warm and dry. Neurological:      General: No focal deficit present. Mental Status: She is alert and oriented to person, place, and time. Psychiatric:         Mood and Affect: Mood normal.         Behavior: Behavior normal. Behavior is cooperative. ASSESSMENT and PLAN    Noble Rodriguez was seen today for cough. Diagnoses and all orders for this visit:    Upper respiratory tract infection, unspecified type  -     levoFLOXacin (LEVAQUIN) 500 MG tablet; Take 1 tablet by mouth daily for 7 days    Anxiety and depression  -     sertraline (ZOLOFT) 25 MG tablet; Take 1 tablet by mouth daily    Hypothyroidism, unspecified type  -     levothyroxine (SYNTHROID) 50 MCG tablet; Take 1 tablet by mouth Daily    Discussed increasing fluid intake to at a minimal of 100 oz of water a day. Take all of prescribed medication even if she starts to feel better. Eat small meals when able to. If SOB continues she will need to follow up with pcp. Medication usage explained to patient. Questions encouraged and answered. Note written for work excuse x 3 days. May return to work if no fever x 24 hours. SEs and risk vs benefits associated with medications prescribed discussed with patient who verbalized understanding. Pt verbalized understanding and agreement with plan of care. RTC for persisting/worsening symptoms or new complaints that arise. Discussed signs and symptoms that would warrant immediate evaluation including, but not limited to HA, blurred vision, speech disturbance, difficulty with ambulation/gait, numbness, tingling, weakness, syncope, chest pain, or shortness of breath.     I have reviewed the patient's medication list, past medical, family, social, and surgical history in detail and updated the patient record appropriately. No follow-up provider specified.     Diego Lockwood NP

## 2023-01-26 ENCOUNTER — TELEPHONE (OUTPATIENT)
Dept: OCCUPATIONAL MEDICINE | Age: 58
End: 2023-01-26

## 2023-01-26 NOTE — TELEPHONE ENCOUNTER
Attempted to contact patient via phone. Voicemail left stating that I wanted to follow up with patient and see how she was feeling. Instructed to call clinic back if she needed further evaluation.

## 2023-02-07 ENCOUNTER — OFFICE VISIT (OUTPATIENT)
Dept: OCCUPATIONAL MEDICINE | Age: 58
End: 2023-02-07

## 2023-02-07 VITALS — TEMPERATURE: 98.7 F | HEART RATE: 91 BPM | SYSTOLIC BLOOD PRESSURE: 113 MMHG | DIASTOLIC BLOOD PRESSURE: 77 MMHG

## 2023-02-07 DIAGNOSIS — J30.2 SEASONAL ALLERGIC RHINITIS, UNSPECIFIED TRIGGER: ICD-10-CM

## 2023-02-07 DIAGNOSIS — H92.02 OTALGIA, LEFT: ICD-10-CM

## 2023-02-07 DIAGNOSIS — I10 PRIMARY HYPERTENSION: ICD-10-CM

## 2023-02-07 DIAGNOSIS — Z01.30 BLOOD PRESSURE CHECK: Primary | ICD-10-CM

## 2023-02-07 RX ORDER — LISINOPRIL 20 MG/1
20 TABLET ORAL DAILY
Qty: 90 TABLET | Refills: 0 | Status: SHIPPED | OUTPATIENT
Start: 2023-02-07 | End: 2023-05-08

## 2023-02-07 ASSESSMENT — ENCOUNTER SYMPTOMS
SORE THROAT: 1
SINUS PRESSURE: 1
TROUBLE SWALLOWING: 0
COUGH: 1
RHINORRHEA: 1
SHORTNESS OF BREATH: 0
DIARRHEA: 0
WHEEZING: 0
CHEST TIGHTNESS: 0

## 2023-02-07 NOTE — PATIENT INSTRUCTIONS
Patient Education        Allergies: Care Instructions  Overview     Allergies occur when your body's defense system (immune system) overreacts to certain substances. The immune system treats a harmless substance as if it were a harmful germ or virus. Many things can make this happen. These include pollens, medicine, food, dust, animal dander, and mold.  Allergies can be mild or severe. Mild allergies can be managed with home treatment. But medicine may be needed to prevent problems.  Managing your allergies is an important part of staying healthy. Your doctor may suggest that you have allergy testing to help find out what is causing your allergies.  Severe allergies can cause reactions that affect your whole body (anaphylactic reactions). Your doctor may prescribe a shot of epinephrine to carry with you in case you have a severe reaction. Learn how to give yourself the shot and keep it with you at all times. Make sure it is not .  Follow-up care is a key part of your treatment and safety. Be sure to make and go to all appointments, and call your doctor if you are having problems. It's also a good idea to know your test results and keep a list of the medicines you take.  How can you care for yourself at home?  If you have been told by your doctor that dust or dust mites are causing your allergy, decrease the dust around your bed:  Wash sheets, pillowcases, and other bedding in hot water every week.  Use dust-proof covers for pillows, duvets, and mattresses. Avoid plastic covers because they tear easily and do not \"breathe.\" Wash as instructed on the label.  Do not use any blankets and pillows that you do not need.  Use blankets that you can wash in your washing machine.  Consider removing drapes and carpets, which attract and hold dust, from your bedroom.  If you are allergic to house dust and mites, do not use home humidifiers. Your doctor can suggest ways you can control dust and mites.  Look for signs of  cockroaches. Cockroaches cause allergic reactions. Use cockroach baits to get rid of them. Then, clean your home well. Cockroaches like areas where grocery bags, newspapers, empty bottles, or cardboard boxes are stored. Do not keep these inside your home, and keep trash and food containers sealed. Seal off any spots where cockroaches might enter your home. If you are allergic to mold, get rid of furniture, rugs, and drapes that smell musty. Check for mold in the bathroom. If you are allergic to outdoor pollen or mold spores, use air-conditioning. Change or clean all filters every month. Keep windows closed. If you are allergic to pollen, stay inside when pollen counts are high. Use a vacuum  with a HEPA filter or a double-thickness filter at least two times each week. Stay inside when air pollution is bad. Avoid paint fumes, perfumes, and other strong odors. Avoid conditions that make your allergies worse. Stay away from smoke. Do not smoke or let anyone else smoke in your house. Do not use fireplaces or wood-burning stoves. If you are allergic to your pets, change the air filter in your furnace every month. Use high-efficiency filters. If you are allergic to pet dander, keep pets outside or out of your bedroom. Old carpet and cloth furniture can hold a lot of animal dander. You may need to replace them. When should you call for help? Give an epinephrine shot if:    You think you are having a severe allergic reaction. You have symptoms in more than one body area, such as mild nausea and an itchy mouth. After giving an epinephrine shot call 911, even if you feel better. Call 911 if:    You have symptoms of a severe allergic reaction. These may include:  Sudden raised, red areas (hives) all over your body. Swelling of the throat, mouth, lips, or tongue. Trouble breathing. Passing out (losing consciousness). Or you may feel very lightheaded or suddenly feel weak, confused, or restless. You have been given an epinephrine shot, even if you feel better. Call your doctor now or seek immediate medical care if:    You have symptoms of an allergic reaction, such as:  A rash or hives (raised, red areas on the skin). Itching. Swelling. Belly pain, nausea, or vomiting. Watch closely for changes in your health, and be sure to contact your doctor if:    You do not get better as expected. Where can you learn more? Go to http://www.woods.com/ and enter W171 to learn more about \"Allergies: Care Instructions. \"  Current as of: April 20, 2022               Content Version: 13.5  © 7934-7074 [employer]. Care instructions adapted under license by [facility] ([facility]). If you have questions about a medical condition or this instruction, always ask your healthcare professional. [address] any warranty or liability for your use of this information.

## 2023-02-07 NOTE — PROGRESS NOTES
PROGRESS NOTE    SUBJECTIVE:   Sudhakar Rivera is a 56 y.o. female seen for left ear pain and woke up this am with a sore throat. Patient states that she does have seasonal allergies and does have allergic symptoms to dog hair. states that she did dog sit this past weekend. She states that at her work place she had cold air blowing on left side of face yesterday and then she started experiencing pain on that side of her face last night. She was diagnosed with TMJ a couple of months ago and has not seen a dentist for her problem. Denies taking any medication for her symptoms. Otalgia   There is pain in the left ear. This is a recurrent problem. The current episode started in the past 7 days. The problem occurs every few minutes. The problem has been unchanged. There has been no fever. The pain is moderate. Associated symptoms include coughing, rhinorrhea and a sore throat. Pertinent negatives include no diarrhea, ear discharge, headaches, hearing loss, neck pain or rash. She has tried nothing for the symptoms. There is no history of a chronic ear infection, hearing loss or a tympanostomy tube.      Current Outpatient Medications   Medication Sig Dispense Refill    lisinopril (PRINIVIL;ZESTRIL) 20 MG tablet Take 1 tablet by mouth daily 90 tablet 0    levothyroxine (SYNTHROID) 50 MCG tablet Take 1 tablet by mouth Daily 30 tablet 3    sertraline (ZOLOFT) 25 MG tablet Take 1 tablet by mouth daily 30 tablet 0    hydroCHLOROthiazide (HYDRODIURIL) 25 MG tablet Take 1 tablet by mouth daily 30 tablet 1    omeprazole (PRILOSEC) 20 MG delayed release capsule Take 1 capsule by mouth daily 90 capsule 1    albuterol sulfate HFA (PROVENTIL;VENTOLIN;PROAIR) 108 (90 Base) MCG/ACT inhaler INHALE TWO PUFFS BY MOUTH EVERY 4 HOURS AS NEEDED FOR WHEEZING (Patient not taking: Reported on 2/7/2023)      varenicline (CHANTIX) 1 MG tablet Take 1 tablet by mouth daily (Patient not taking: Reported on 2/7/2023) 60 tablet 0 ipratropium (ATROVENT HFA) 17 MCG/ACT inhaler Inhale 1 puff into the lungs 3 times daily (Patient not taking: Reported on 1/3/2023) 1 each 3    sertraline (ZOLOFT) 50 MG tablet Take 1 tablet by mouth daily (Patient not taking: Reported on 2/7/2023) 90 tablet 1     No current facility-administered medications for this visit. Allergies   Allergen Reactions    Bromfed Dm [[id number]] Other (See Comments)     jitters     Social History     Tobacco Use    Smoking status: Former     Types: Cigarettes    Smokeless tobacco: Not on file   Substance Use Topics    Alcohol use: Not Currently     Past Medical History:   Diagnosis Date    Menopause     @51    History reviewed. No pertinent surgical history. Review of Systems   Constitutional:  Negative for chills, fatigue and fever. HENT:  Positive for ear pain, postnasal drip, rhinorrhea, sinus pressure and sore throat. Negative for ear discharge, hearing loss, tinnitus and trouble swallowing. Respiratory:  Positive for cough. Negative for chest tightness, shortness of breath and wheezing. Gastrointestinal:  Negative for diarrhea. Musculoskeletal:  Negative for neck pain. Skin: Negative. Negative for rash. Allergic/Immunologic: Positive for environmental allergies. Neurological: Negative. Negative for headaches. Hematological: Negative. Psychiatric/Behavioral: Negative. OBJECTIVE:  /77 (Site: Right Upper Arm, Position: Sitting, Cuff Size: Large Adult)   Pulse 91   Temp 98.7 °F (37.1 °C)      Physical Exam  Constitutional:       Appearance: Normal appearance. She is well-developed and well-groomed. She is not ill-appearing. HENT:      Head: Normocephalic. Right Ear: Hearing normal. No tenderness. No middle ear effusion. Tympanic membrane is not injected, scarred, perforated, erythematous, retracted or bulging. Left Ear: Hearing normal. No swelling or tenderness. No middle ear effusion.  There is no impacted cerumen. No mastoid tenderness. Tympanic membrane is not injected, scarred, perforated, erythematous, retracted or bulging.      Nose: Rhinorrhea present.      Right Turbinates: Not enlarged or swollen.      Left Turbinates: Pale. Not enlarged or swollen.      Right Sinus: No maxillary sinus tenderness or frontal sinus tenderness.      Left Sinus: Maxillary sinus tenderness present. No frontal sinus tenderness.      Mouth/Throat:      Lips: Pink.      Mouth: Mucous membranes are moist. No injury or oral lesions.      Tongue: No lesions. Tongue does not deviate from midline.      Pharynx: Uvula midline. Posterior oropharyngeal erythema present. No pharyngeal swelling or oropharyngeal exudate.      Tonsils: No tonsillar exudate or tonsillar abscesses. 1+ on the right. 1+ on the left.      Comments: Facial edema noted over left masseter and patient limited in ability to open mouth.     Cobblestoning noted.  Cardiovascular:      Rate and Rhythm: Normal rate.      Pulses: Normal pulses.   Pulmonary:      Effort: Pulmonary effort is normal.   Lymphadenopathy:      Head:      Left side of head: No submental, submandibular, tonsillar, preauricular or posterior auricular adenopathy.   Skin:     General: Skin is warm and dry.   Neurological:      General: No focal deficit present.      Mental Status: She is alert and oriented to person, place, and time.   Psychiatric:         Mood and Affect: Mood normal.         Behavior: Behavior normal. Behavior is cooperative.     ASSESSMENT and PLAN    Diagnoses and all orders for this visit:    Blood pressure check    Seasonal allergic rhinitis, unspecified trigger    Primary hypertension  -     lisinopril (PRINIVIL;ZESTRIL) 20 MG tablet; Take 1 tablet by mouth daily    Otalgia, left    Hypertension:  Hypertension to be managed by pcp.  Return to clinic in one week for blood pressure recheck.     Medication refill for her lisinopril and sent to preferred pharmacy.  Take as directed.  Recommend patient call Dentist today to address her TMJ. Allergic Rhinitis:   Patient to start Flonase BID, daily antihistamine of choice, increase fluid intake and rest.  Work note given for today only. Advised to take 600 mg of ibuprofen for tmj pain/inflammation  3x a day x 4 days. If you are allergic to your pets, change the air filter in your furnace every month. Use high-efficiency filters. If you are allergic to pet dander, keep pets outside or out of your bedroom. Old carpet and cloth furniture can hold a lot of animal dander. You may need to replace them. Counseled on benefits of having a primary care provider which includes, but is not limited to, continuity of care and having a medical home when concerns arise. Also enforced that onsite clinic policy states that we are not to take the place of a primary care provider, pt verbalized understanding. SEs and risk vs benefits associated with medications prescribed discussed with patient who verbalized understanding. Pt verbalized understanding and agreement with plan of care. RTC for persisting/worsening symptoms or new complaints that arise. Discussed signs and symptoms that would warrant immediate evaluation including, but not limited to HA, blurred vision, speech disturbance, difficulty with ambulation/gait, numbness, tingling, weakness, syncope, chest pain, or shortness of breath. I have reviewed the patient's medication list, past medical, family, social, and surgical history in detail and updated the patient record appropriately. No follow-up provider specified.     Bella Barker NP

## 2023-02-14 DIAGNOSIS — F41.9 ANXIETY AND DEPRESSION: ICD-10-CM

## 2023-02-14 DIAGNOSIS — F32.A ANXIETY AND DEPRESSION: ICD-10-CM

## 2023-02-14 NOTE — TELEPHONE ENCOUNTER
Patient requests her dose of Zoloft be increased to 50 mg  from the 25 mg. We had previously discussed that if she felt that the 25 mg dose was not working and if she felt that her anxiety was increasing that we could go back to the 50 mg dose. Prescription for 50 mg Zoloft sent to preferred pharmacy. Denies any further needs or concerns at this time.

## 2023-02-21 ENCOUNTER — OFFICE VISIT (OUTPATIENT)
Dept: OCCUPATIONAL MEDICINE | Age: 58
End: 2023-02-21

## 2023-02-21 DIAGNOSIS — R11.0 NAUSEA: ICD-10-CM

## 2023-02-21 DIAGNOSIS — R05.1 ACUTE COUGH: ICD-10-CM

## 2023-02-21 DIAGNOSIS — J06.9 UPPER RESPIRATORY TRACT INFECTION, UNSPECIFIED TYPE: Primary | ICD-10-CM

## 2023-02-21 DIAGNOSIS — J00 ACUTE NASOPHARYNGITIS: ICD-10-CM

## 2023-02-21 LAB
INFLUENZA A ANTIGEN, POC: NEGATIVE
INFLUENZA B ANTIGEN, POC: NEGATIVE
SARS-COV-2 RNA, POC: NEGATIVE

## 2023-02-21 RX ORDER — BENZONATATE 100 MG/1
100-200 CAPSULE ORAL 3 TIMES DAILY PRN
Qty: 60 CAPSULE | Refills: 0 | Status: SHIPPED | OUTPATIENT
Start: 2023-02-21 | End: 2023-03-03

## 2023-02-21 RX ORDER — AZITHROMYCIN 250 MG/1
250 TABLET, FILM COATED ORAL SEE ADMIN INSTRUCTIONS
Qty: 6 TABLET | Refills: 0 | Status: SHIPPED | OUTPATIENT
Start: 2023-02-21 | End: 2023-02-26

## 2023-02-21 RX ORDER — PROMETHAZINE HYDROCHLORIDE 25 MG/1
25 TABLET ORAL 4 TIMES DAILY PRN
Qty: 20 TABLET | Refills: 0 | Status: SHIPPED | OUTPATIENT
Start: 2023-02-21 | End: 2023-02-28

## 2023-02-21 NOTE — PROGRESS NOTES
PROGRESS NOTE    SUBJECTIVE:   Amanda Garcia is a 56 y.o. female seen for productive cough, SOB, nausea, sore throat, fever, and headache x 5 days. She recently had pneumonia in early January. She has used inhaler x 2x a day  x 3 days, Zunilda Dugway cold and flu, and Mucinex every 12 hours for symptoms. Denies emesis or diarrhea. States that her symptoms came on fast.  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 Influenza  This is a new problem. The current episode started in the past 7 days. The problem occurs constantly. The problem has been gradually worsening. Associated symptoms include chest pain, chills, congestion, coughing, fatigue, a fever, headaches, nausea, a sore throat and weakness. Pertinent negatives include no swollen glands, vertigo, visual change or vomiting. The symptoms are aggravated by coughing, exertion and standing. She has tried NSAIDs, drinking, acetaminophen, position changes, sleep and rest for the symptoms. The treatment provided mild relief.      Current Outpatient Medications   Medication Sig Dispense Refill    azithromycin (ZITHROMAX) 250 MG tablet Take 1 tablet by mouth See Admin Instructions for 5 days 500mg on day 1 followed by 250mg on days 2 - 5 6 tablet 0    benzonatate (TESSALON) 100 MG capsule Take 1-2 capsules by mouth 3 times daily as needed for Cough 60 capsule 0    promethazine (PHENERGAN) 25 MG tablet Take 1 tablet by mouth 4 times daily as needed for Nausea 20 tablet 0    sertraline (ZOLOFT) 50 MG tablet Take 1 tablet by mouth daily 90 tablet 1    lisinopril (PRINIVIL;ZESTRIL) 20 MG tablet Take 1 tablet by mouth daily 90 tablet 0    levothyroxine (SYNTHROID) 50 MCG tablet Take 1 tablet by mouth Daily 30 tablet 3    albuterol sulfate HFA (PROVENTIL;VENTOLIN;PROAIR) 108 (90 Base) MCG/ACT inhaler INHALE TWO PUFFS BY MOUTH EVERY 4 HOURS AS NEEDED FOR WHEEZING (Patient not taking: Reported on 2/7/2023)      hydroCHLOROthiazide (HYDRODIURIL) 25 MG tablet Take 1 tablet by mouth daily 30 tablet 1    varenicline (CHANTIX) 1 MG tablet Take 1 tablet by mouth daily (Patient not taking: Reported on 2/7/2023) 60 tablet 0    omeprazole (PRILOSEC) 20 MG delayed release capsule Take 1 capsule by mouth daily 90 capsule 1    ipratropium (ATROVENT HFA) 17 MCG/ACT inhaler Inhale 1 puff into the lungs 3 times daily (Patient not taking: Reported on 1/3/2023) 1 each 3     No current facility-administered medications for this visit. Allergies   Allergen Reactions    Bromfed Dm [[id number]] Other (See Comments)     jitters     Social History     Tobacco Use    Smoking status: Former     Types: Cigarettes    Smokeless tobacco: Not on file   Substance Use Topics    Alcohol use: Not Currently       Review of Systems   Constitutional:  Positive for chills, fatigue and fever. HENT:  Positive for congestion, sneezing and sore throat. Negative for ear pain and postnasal drip. Eyes: Negative. Respiratory:  Positive for cough. Cardiovascular:  Positive for chest pain. Gastrointestinal:  Positive for nausea. Negative for vomiting. Genitourinary: Negative. Allergic/Immunologic: Positive for environmental allergies. Neurological:  Positive for weakness and headaches. Negative for vertigo. Hematological: Negative. Psychiatric/Behavioral: Negative. OBJECTIVE:  /76 (Site: Right Upper Arm, Position: Sitting, Cuff Size: Large Adult)   Pulse 76   Temp 98.4 °F (36.9 °C)   SpO2 93%      Physical Exam  Constitutional:       General: She is not in acute distress. Appearance: Normal appearance. She is well-developed and well-groomed. She is ill-appearing. HENT:      Head: Normocephalic. Right Ear: A middle ear effusion is present. Tympanic membrane is not injected, scarred, perforated, erythematous or retracted. Left Ear:  No middle ear effusion. Tympanic membrane is not injected, scarred, perforated, erythematous or retracted. Nose: Congestion and rhinorrhea present. Rhinorrhea is clear. Right Turbinates: Not enlarged or swollen. Left Turbinates: Enlarged and swollen. Mouth/Throat:      Lips: Pink. Mouth: Mucous membranes are moist.      Pharynx: Pharyngeal swelling and posterior oropharyngeal erythema present. No oropharyngeal exudate or uvula swelling. Tonsils: No tonsillar exudate or tonsillar abscesses. Cardiovascular:      Rate and Rhythm: Normal rate and regular rhythm. Pulses: Normal pulses. Heart sounds: Normal heart sounds, S1 normal and S2 normal. No murmur heard. No friction rub. No gallop. Pulmonary:      Effort: No respiratory distress. Breath sounds: Decreased air movement present. No stridor. Examination of the right-upper field reveals decreased breath sounds. Examination of the left-upper field reveals decreased breath sounds and wheezing. Examination of the right-middle field reveals rhonchi. Examination of the left-middle field reveals rhonchi. Decreased breath sounds, wheezing and rhonchi present. No rales. Abdominal:      General: Abdomen is flat. There is no distension. Palpations: Abdomen is soft. Tenderness: There is no abdominal tenderness. Lymphadenopathy:      Head:      Right side of head: Submandibular adenopathy present. No submental, tonsillar, preauricular or posterior auricular adenopathy. Left side of head: Submandibular adenopathy present. No submental, tonsillar, preauricular or posterior auricular adenopathy. Skin:     General: Skin is warm and dry. Neurological:      General: No focal deficit present. Mental Status: She is alert and oriented to person, place, and time. Psychiatric:         Mood and Affect: Mood normal.         Behavior: Behavior normal. Behavior is cooperative.      AMB POC SARS-COV-2 AND INFLUENZA A/B   Result Value Ref Range    SARS-COV-2 RNA, POC Negative     Influenza A Antigen, POC Negative Negative Influenza B Antigen, POC Negative Negative      ASSESSMENT and PLAN    Diagnoses and all orders for this visit:    Upper respiratory tract infection, unspecified type  -     azithromycin (ZITHROMAX) 250 MG tablet; Take 1 tablet by mouth See Admin Instructions for 5 days 500mg on day 1 followed by 250mg on days 2 - 5  -     AMB POC SARS-COV-2 AND INFLUENZA A/B    Nausea  -     promethazine (PHENERGAN) 25 MG tablet; Take 1 tablet by mouth 4 times daily as needed for Nausea    Acute cough  -     benzonatate (TESSALON) 100 MG capsule; Take 1-2 capsules by mouth 3 times daily as needed for Cough    Acute nasopharyngitis  -     azithromycin (ZITHROMAX) 250 MG tablet; Take 1 tablet by mouth See Admin Instructions for 5 days 500mg on day 1 followed by 250mg on days 2 - 5    Advised patient that if she did not start feeling better within 24-48 hours to go to UC or ER for chest xray due to limitations of this clinic and history of pneumonia. Patient verbalized understanding. Will start antibiotic since she has recently had pneumonia. Advised using otc cough medications when needed but not to take when taking the prescribed phenergan due to respiratory depression. Increase fluid intake, gargle with salt water 3x a day for sore throat, Tylenol for headache and to reduce fever, and use inhaler for wheezing or sob. Patient given note for work. Return to clinic as needed. Counseled on benefits of having a primary care provider which includes, but is not limited to, continuity of care and having a medical home when concerns arise. Also enforced that onsite clinic policy states that we are not to take the place of a primary care provider, pt verbalized understanding. SEs and risk vs benefits associated with medications prescribed discussed with patient who verbalized understanding. Pt verbalized understanding and agreement with plan of care. RTC for persisting/worsening symptoms or new complaints that arise. Discussed signs and symptoms that would warrant immediate evaluation including, but not limited to HA, blurred vision, speech disturbance, difficulty with ambulation/gait, numbness, tingling, weakness, syncope, chest pain, or shortness of breath. I have reviewed the patient's medication list, past medical, family, social, and surgical history in detail and updated the patient record appropriately. No follow-up provider specified.     Trent Polanco NP

## 2023-02-22 VITALS
SYSTOLIC BLOOD PRESSURE: 114 MMHG | HEART RATE: 76 BPM | TEMPERATURE: 98.4 F | DIASTOLIC BLOOD PRESSURE: 76 MMHG | OXYGEN SATURATION: 93 %

## 2023-02-22 ASSESSMENT — ENCOUNTER SYMPTOMS
VISUAL CHANGE: 0
EYES NEGATIVE: 1
NAUSEA: 1
COUGH: 1
SWOLLEN GLANDS: 0
FLU SYMPTOMS: 1
VOMITING: 0
SORE THROAT: 1

## 2023-03-07 ENCOUNTER — OFFICE VISIT (OUTPATIENT)
Dept: OCCUPATIONAL MEDICINE | Age: 58
End: 2023-03-07

## 2023-03-07 DIAGNOSIS — Z76.0 MEDICATION REFILL: ICD-10-CM

## 2023-03-07 DIAGNOSIS — I10 HYPERTENSION, UNSPECIFIED TYPE: ICD-10-CM

## 2023-03-07 DIAGNOSIS — Z71.6 ENCOUNTER FOR SMOKING CESSATION COUNSELING: ICD-10-CM

## 2023-03-07 DIAGNOSIS — R06.2 WHEEZING: Primary | ICD-10-CM

## 2023-03-07 DIAGNOSIS — R11.0 NAUSEA: ICD-10-CM

## 2023-03-07 RX ORDER — ALBUTEROL SULFATE 90 UG/1
AEROSOL, METERED RESPIRATORY (INHALATION)
Qty: 18 G | Refills: 0 | Status: SHIPPED | OUTPATIENT
Start: 2023-03-07

## 2023-03-07 RX ORDER — PROMETHAZINE HYDROCHLORIDE 25 MG/1
25 TABLET ORAL 4 TIMES DAILY PRN
Qty: 20 TABLET | Refills: 0 | Status: SHIPPED | OUTPATIENT
Start: 2023-03-07 | End: 2023-03-14

## 2023-03-07 RX ORDER — HYDROCHLOROTHIAZIDE 25 MG/1
25 TABLET ORAL DAILY
Qty: 90 TABLET | Refills: 1 | Status: SHIPPED | OUTPATIENT
Start: 2023-03-07

## 2023-03-07 ASSESSMENT — ENCOUNTER SYMPTOMS
COUGH: 1
RHINORRHEA: 1
SHORTNESS OF BREATH: 0
NAUSEA: 1
WHEEZING: 1
SINUS PRESSURE: 0

## 2023-03-07 NOTE — PROGRESS NOTES
PROGRESS NOTE    SUBJECTIVE:   Anand Tejada is a 56 y.o. female seen for medication refill. She was diagnosed with bronchitis 1.5 weeks ago. States that she is still wheezing on occasion, has a non productive cough, and is still experiencing nausea after she experiences \"coughing fits\". Denies SOB, headache, stomach pain, fever, chest pain. 3/9/23- patient requesting refill for Chantix. She has used this script in the past and would like a refill. She states that she is currently smoking 3/day. Denies any relief with nicotine patches. Denies suicidal/homicidal ideations current or past. Denies current or hx of seizures. States that she has smoked for years and needs help to quit and to [place] quit\". Cough  This is a recurrent problem. The current episode started 1 to 4 weeks ago. The problem has been gradually improving. The problem occurs hourly. The cough is Non-productive. Associated symptoms include nasal congestion, postnasal drip, rhinorrhea and wheezing. Pertinent negatives include no chest pain, chills, fever, headaches, shortness of breath or sweats. The symptoms are aggravated by lying down. Risk factors for lung disease include occupational exposure. She has tried body position changes, OTC cough suppressant and ipratropium inhaler for the symptoms. The treatment provided moderate relief. Her past medical history is significant for bronchitis and environmental allergies.      Current Outpatient Medications   Medication Sig Dispense Refill    varenicline (CHANTIX) 1 MG tablet Take 1 tablet by mouth daily 60 tablet 0    albuterol sulfate HFA (PROVENTIL;VENTOLIN;PROAIR) 108 (90 Base) MCG/ACT inhaler INHALE TWO PUFFS BY MOUTH EVERY 4 HOURS AS NEEDED FOR WHEEZING 18 g 0    hydroCHLOROthiazide (HYDRODIURIL) 25 MG tablet Take 1 tablet by mouth daily 90 tablet 1    promethazine (PHENERGAN) 25 MG tablet Take 1 tablet by mouth 4 times daily as needed for Nausea 20 tablet 0    sertraline (ZOLOFT) 50 MG tablet Take 1 tablet by mouth daily 90 tablet 1    lisinopril (PRINIVIL;ZESTRIL) 20 MG tablet Take 1 tablet by mouth daily 90 tablet 0    levothyroxine (SYNTHROID) 50 MCG tablet Take 1 tablet by mouth Daily 30 tablet 3    omeprazole (PRILOSEC) 20 MG delayed release capsule Take 1 capsule by mouth daily 90 capsule 1    ipratropium (ATROVENT HFA) 17 MCG/ACT inhaler Inhale 1 puff into the lungs 3 times daily (Patient not taking: Reported on 1/3/2023) 1 each 3     No current facility-administered medications for this visit. Allergies   Allergen Reactions    Bromfed Dm [[id number]] Other (See Comments)     jitters     Social History     Tobacco Use    Smoking status: Former     Types: Cigarettes    Smokeless tobacco: Not on file   Substance Use Topics    Alcohol use: Not Currently       Review of Systems   Constitutional:  Negative for chills and fever. HENT:  Positive for postnasal drip and rhinorrhea. Negative for sinus pressure. Respiratory:  Positive for cough and wheezing. Negative for shortness of breath. Cardiovascular:  Negative for chest pain. Gastrointestinal:  Positive for nausea. Endocrine: Negative. Musculoskeletal: Negative. Skin: Negative. Allergic/Immunologic: Positive for environmental allergies. Neurological:  Negative for headaches. Hematological: Negative. Psychiatric/Behavioral: Negative. Physical Exam  Constitutional:       General: She is not in acute distress. Appearance: Normal appearance. She is well-developed and well-groomed. She is not ill-appearing. HENT:      Head: Normocephalic. Nose: Rhinorrhea present. Mouth/Throat:      Mouth: Mucous membranes are moist.   Cardiovascular:      Rate and Rhythm: Normal rate and regular rhythm. Pulmonary:      Effort: Pulmonary effort is normal. No respiratory distress. Breath sounds: Examination of the right-upper field reveals decreased breath sounds.  Examination of the left-upper field reveals decreased breath sounds, wheezing and rales. Decreased breath sounds, wheezing and rales present. Abdominal:      General: Abdomen is flat. Bowel sounds are normal.      Palpations: Abdomen is soft. Tenderness: There is no abdominal tenderness. There is no guarding. Skin:     General: Skin is warm and dry. Neurological:      General: No focal deficit present. Mental Status: She is alert and oriented to person, place, and time. Psychiatric:         Mood and Affect: Mood normal.         Behavior: Behavior normal. Behavior is cooperative. ASSESSMENT and PLAN    Cholo Baird was seen today for medication refill. Diagnoses and all orders for this visit:    Wheezing  -     albuterol sulfate HFA (PROVENTIL;VENTOLIN;PROAIR) 108 (90 Base) MCG/ACT inhaler; INHALE TWO PUFFS BY MOUTH EVERY 4 HOURS AS NEEDED FOR WHEEZING    Hypertension, unspecified type  -     hydroCHLOROthiazide (HYDRODIURIL) 25 MG tablet; Take 1 tablet by mouth daily    Nausea  -     promethazine (PHENERGAN) 25 MG tablet; Take 1 tablet by mouth 4 times daily as needed for Nausea    Medication refill    Encounter for smoking cessation counseling  -     varenicline (CHANTIX) 1 MG tablet; Take 1 tablet by mouth daily    Discussed options for smoking cessation. Varenicline 1mg take 1 po BID daily; disp 60;  Discussed with patient that it may take as long as 16 weeks of medication for  smoking cessation success. Discussed reasons to seek emergency medical attention to include: CP/SOB/ homicidal or suicidal ideations. Patient verbalized understanding  Discussed risks vs benefits of medication as well as side effects to include changes in mood, insomnia, hallucinations, dry mouth, and headache, etc.  Discussed the need for behavioral management counseling along with medication for best success. Instructed to RTC weekly for reevaluation. Discussed with patient regarding the use of her inhaler.   Advised patient to call pcp regarding switching to a once a day inhaler and possibly inquiring about having a PFT. Patient is to eat small frequent bland diet meals until nausea subsides. Discussed keeping hydrated with water and to reduce caffeine intake. Can also drink low sugar Gatorade or Pedialyte for hydration. Return to clinic as needed. Counseled on benefits of having a primary care provider which includes, but is not limited to, continuity of care and having a medical home when concerns arise. Also enforced that onsite clinic policy states that we are not to take the place of a primary care provider, pt verbalized understanding. SEs and risk vs benefits associated with medications prescribed discussed with patient who verbalized understanding. Pt verbalized understanding and agreement with plan of care. RTC for persisting/worsening symptoms or new complaints that arise. Discussed signs and symptoms that would warrant immediate evaluation including, but not limited to HA, blurred vision, speech disturbance, difficulty with ambulation/gait, numbness, tingling, weakness, syncope, chest pain, or shortness of breath. I have reviewed the patient's medication list, past medical, family, social, and surgical history in detail and updated the patient record appropriately. No follow-up provider specified.     Ivy Morgan NP

## 2023-03-09 RX ORDER — VARENICLINE TARTRATE 1 MG/1
1 TABLET, FILM COATED ORAL DAILY
Qty: 60 TABLET | Refills: 0 | Status: SHIPPED | OUTPATIENT
Start: 2023-03-09

## 2023-04-20 ENCOUNTER — OFFICE VISIT (OUTPATIENT)
Dept: OCCUPATIONAL MEDICINE | Age: 58
End: 2023-04-20

## 2023-04-20 DIAGNOSIS — R53.83 OTHER FATIGUE: ICD-10-CM

## 2023-04-20 DIAGNOSIS — E55.9 VITAMIN D DEFICIENCY, UNSPECIFIED: ICD-10-CM

## 2023-04-20 DIAGNOSIS — Z00.00 ENCOUNTER FOR WELLNESS EXAMINATION IN ADULT: Primary | ICD-10-CM

## 2023-04-20 DIAGNOSIS — E03.9 ADULT HYPOTHYROIDISM: ICD-10-CM

## 2023-04-20 NOTE — PROGRESS NOTES
limited to, continuity of care and having a medical home when concerns arise. Also enforced that onsite clinic policy states that we are not to take the place of a primary care provider, pt verbalized understanding. SEs and risk vs benefits associated with medications prescribed discussed with patient who verbalized understanding. Pt verbalized understanding and agreement with plan of care. RTC for persisting/worsening symptoms or new complaints that arise. Discussed signs and symptoms that would warrant immediate evaluation including, but not limited to HA, blurred vision, speech disturbance, difficulty with ambulation/gait, numbness, tingling, weakness, syncope, chest pain, or shortness of breath. I have reviewed the patient's medication list, past medical, family, social, and surgical history in detail and updated the patient record appropriately. No follow-up provider specified.     Maranda Stratton NP

## 2023-04-26 ENCOUNTER — TRANSCRIBE ORDERS (OUTPATIENT)
Dept: SCHEDULING | Age: 58
End: 2023-04-26

## 2023-04-26 DIAGNOSIS — Z12.31 SCREENING MAMMOGRAM FOR HIGH-RISK PATIENT: Primary | ICD-10-CM

## 2023-04-27 ENCOUNTER — TELEPHONE (OUTPATIENT)
Dept: OCCUPATIONAL MEDICINE | Age: 58
End: 2023-04-27

## 2023-04-27 DIAGNOSIS — Z01.89 ENCOUNTER FOR LABORATORY TEST: Primary | ICD-10-CM

## 2023-04-27 NOTE — TELEPHONE ENCOUNTER
Called patient to discuss labs. Patient did not answer. Left message for patient that I will try again later to discuss labs.       Estephanie Garland NP

## 2023-05-02 ENCOUNTER — HOSPITAL ENCOUNTER (OUTPATIENT)
Dept: MAMMOGRAPHY | Age: 58
Discharge: HOME | End: 2023-05-05
Payer: COMMERCIAL

## 2023-05-02 ENCOUNTER — OFFICE VISIT (OUTPATIENT)
Dept: OCCUPATIONAL MEDICINE | Age: 58
End: 2023-05-02

## 2023-05-02 VITALS — WEIGHT: 176 LBS

## 2023-05-02 DIAGNOSIS — Z12.31 SCREENING MAMMOGRAM FOR HIGH-RISK PATIENT: ICD-10-CM

## 2023-05-02 DIAGNOSIS — F32.A ANXIETY AND DEPRESSION: ICD-10-CM

## 2023-05-02 DIAGNOSIS — F41.9 ANXIETY AND DEPRESSION: ICD-10-CM

## 2023-05-02 DIAGNOSIS — K21.00 GASTROESOPHAGEAL REFLUX DISEASE WITH ESOPHAGITIS WITHOUT HEMORRHAGE: ICD-10-CM

## 2023-05-02 DIAGNOSIS — E03.9 HYPOTHYROIDISM, UNSPECIFIED TYPE: ICD-10-CM

## 2023-05-02 DIAGNOSIS — I10 PRIMARY HYPERTENSION: ICD-10-CM

## 2023-05-02 DIAGNOSIS — I10 HYPERTENSION, UNSPECIFIED TYPE: ICD-10-CM

## 2023-05-02 PROCEDURE — 77063 BREAST TOMOSYNTHESIS BI: CPT

## 2023-05-02 RX ORDER — HYDROCHLOROTHIAZIDE 25 MG/1
25 TABLET ORAL DAILY
Qty: 90 TABLET | Refills: 1 | Status: SHIPPED | OUTPATIENT
Start: 2023-05-02

## 2023-05-02 RX ORDER — OMEPRAZOLE 20 MG/1
20 CAPSULE, DELAYED RELEASE ORAL DAILY
Qty: 90 CAPSULE | Refills: 1 | Status: SHIPPED | OUTPATIENT
Start: 2023-05-02

## 2023-05-02 RX ORDER — LISINOPRIL 20 MG/1
20 TABLET ORAL DAILY
Qty: 90 TABLET | Refills: 1 | Status: SHIPPED | OUTPATIENT
Start: 2023-05-02 | End: 2023-10-29

## 2023-05-02 RX ORDER — LEVOTHYROXINE SODIUM 0.05 MG/1
50 TABLET ORAL DAILY
Qty: 90 TABLET | Refills: 1 | Status: SHIPPED | OUTPATIENT
Start: 2023-05-02

## 2023-05-02 NOTE — PROGRESS NOTES
type  -     levothyroxine (SYNTHROID) 50 MCG tablet; Take 1 tablet by mouth Daily    Anxiety and depression  -     sertraline (ZOLOFT) 50 MG tablet; Take 1 tablet by mouth daily      Discussed patient diet due to high level of triglycerides and LDH. Labs were resulted by [facility].  Vit B12 was greater than 2000 and patient stated that she was taking more than the recommended dosage. Hemoglobin A1C- was 5.5. Patient is to follow up with pcp. Refills of medications sent into preferred pharmacy. Follow up as needed. Counseled on benefits of having a primary care provider which includes, but is not limited to, continuity of care and having a medical home when concerns arise. Also enforced that onsite clinic policy states that we are not to take the place of a primary care provider, pt verbalized understanding. SEs and risk vs benefits associated with medications prescribed discussed with patient who verbalized understanding. Pt verbalized understanding and agreement with plan of care. RTC for persisting/worsening symptoms or new complaints that arise. Discussed signs and symptoms that would warrant immediate evaluation including, but not limited to HA, blurred vision, speech disturbance, difficulty with ambulation/gait, numbness, tingling, weakness, syncope, chest pain, or shortness of breath. I have reviewed the patient's medication list, past medical, family, social, and surgical history in detail and updated the patient record appropriately. No follow-up provider specified.   Larisa Villalobos

## 2023-05-11 ENCOUNTER — TELEPHONE (OUTPATIENT)
Dept: OCCUPATIONAL MEDICINE | Age: 58
End: 2023-05-11

## 2023-05-11 DIAGNOSIS — R11.0 NAUSEA: ICD-10-CM

## 2023-05-11 DIAGNOSIS — J06.9 UPPER RESPIRATORY TRACT INFECTION, UNSPECIFIED TYPE: Primary | ICD-10-CM

## 2023-05-11 DIAGNOSIS — R06.2 WHEEZING: ICD-10-CM

## 2023-05-11 RX ORDER — PROMETHAZINE HYDROCHLORIDE 25 MG/1
25 TABLET ORAL 4 TIMES DAILY PRN
Qty: 20 TABLET | Refills: 0 | Status: SHIPPED | OUTPATIENT
Start: 2023-05-11 | End: 2023-05-18

## 2023-05-11 RX ORDER — AZITHROMYCIN 250 MG/1
250 TABLET, FILM COATED ORAL SEE ADMIN INSTRUCTIONS
Qty: 6 TABLET | Refills: 0 | Status: SHIPPED | OUTPATIENT
Start: 2023-05-11 | End: 2023-05-16

## 2023-05-11 RX ORDER — PREDNISONE 10 MG/1
10 TABLET ORAL DAILY
Qty: 5 TABLET | Refills: 0 | Status: SHIPPED | OUTPATIENT
Start: 2023-05-11 | End: 2023-05-16

## 2023-05-11 NOTE — TELEPHONE ENCOUNTER
Patient states that she is experiencing  congestion, wheezing, productive cough x 6 days. She has tried Mucinex DM and Zunilda [place] Day/Night cough with little relief. Patient has long history of bronchitis with last case being earlier this year. She is also experiencing nausea but denies fever, emesis, or SOB. Patient unable to do virtual visit at this time. PLAN:   Azithromycin - dose pack  Phenergan- 25 mg for nausea  Prednisone- 10 mg x 3 days. Discussed resting, increasing fluids, phenergan for nausea and prednisone for wheezing. If she is not better by Saturday she has been advised to go to Urgent Care or ER for further evaluation. Discussed that phenergan can have sedative effects so she is not to operate heavy machinery while taking. Patient verbalized understanding. Discontinue Mucinex DM   Take medications as prescribed. If not feeling better will need to come to office for physical evaluation.       Judy Griffith NP

## 2023-08-17 ENCOUNTER — TELEPHONE (OUTPATIENT)
Dept: OCCUPATIONAL MEDICINE | Age: 58
End: 2023-08-17

## 2023-08-17 DIAGNOSIS — I10 HYPERTENSION, UNSPECIFIED TYPE: Primary | ICD-10-CM

## 2023-08-17 RX ORDER — HYDROCHLOROTHIAZIDE 25 MG/1
25 TABLET ORAL DAILY
COMMUNITY

## 2023-08-17 NOTE — TELEPHONE ENCOUNTER
Patient called to request refill for HCTZ. Patient denies any other requests at this time. States that she has not had any ill effects while taking this medication. She is to follow up with PCP and check blood pressure at least 3x a week, increase water intake, reduce sodium intake and exercise 150 minutes a day. She is to follow up at wellness clinic as needed.

## 2023-09-07 ENCOUNTER — OFFICE VISIT (OUTPATIENT)
Dept: OCCUPATIONAL MEDICINE | Age: 58
End: 2023-09-07

## 2023-09-07 VITALS — DIASTOLIC BLOOD PRESSURE: 88 MMHG | HEART RATE: 100 BPM | OXYGEN SATURATION: 96 % | SYSTOLIC BLOOD PRESSURE: 158 MMHG

## 2023-09-07 DIAGNOSIS — Z71.6 ENCOUNTER FOR SMOKING CESSATION COUNSELING: ICD-10-CM

## 2023-09-07 DIAGNOSIS — J06.9 VIRAL UPPER RESPIRATORY TRACT INFECTION: Primary | ICD-10-CM

## 2023-09-07 PROBLEM — R03.0 ELEVATED BLOOD-PRESSURE READING WITHOUT DIAGNOSIS OF HYPERTENSION: Status: ACTIVE | Noted: 2023-09-07

## 2023-09-07 PROBLEM — E66.3 OVERWEIGHT (BMI 25.0-29.9): Status: ACTIVE | Noted: 2023-09-07

## 2023-09-07 PROBLEM — J30.1 SEASONAL ALLERGIC RHINITIS DUE TO POLLEN: Status: ACTIVE | Noted: 2018-04-05

## 2023-09-07 RX ORDER — VARENICLINE TARTRATE 1 MG/1
1 TABLET, FILM COATED ORAL DAILY
Qty: 60 TABLET | Refills: 0 | Status: SHIPPED | OUTPATIENT
Start: 2023-09-07

## 2023-09-07 RX ORDER — PREDNISONE 20 MG/1
20 TABLET ORAL DAILY
Qty: 5 TABLET | Refills: 0 | Status: SHIPPED | OUTPATIENT
Start: 2023-09-07 | End: 2023-09-12

## 2023-09-07 ASSESSMENT — ENCOUNTER SYMPTOMS
SORE THROAT: 1
COUGH: 1
RHINORRHEA: 1
GASTROINTESTINAL NEGATIVE: 1
SINUS PAIN: 1

## 2023-09-07 NOTE — PROGRESS NOTES
PROGRESS NOTE    SUBJECTIVE:   Gadiel Washington is a 56 y.o. female seen for nausea, pnd, congestion, and otalgia since last night. Patient states that when she awoke this am and blew her nose she noticed dark blood. Patient states that every coworker in her immediate area has had the same symptoms for the last couple of weeks. URI   This is a new problem. The current episode started yesterday. The problem has been unchanged. Associated symptoms include congestion, coughing, ear pain, a plugged ear sensation, rhinorrhea, sinus pain and a sore throat. She has tried nothing for the symptoms. The treatment provided no relief. Current Outpatient Medications   Medication Sig Dispense Refill    varenicline (CHANTIX) 1 MG tablet Take 1 tablet by mouth daily 0.5 mg x 3days then 0.5 mg twice aday x 4 days. After 7 days may take 1 mg. 60 tablet 0    predniSONE (DELTASONE) 20 MG tablet Take 1 tablet by mouth daily for 5 days 5 tablet 0    hydroCHLOROthiazide (HYDRODIURIL) 25 MG tablet Take 1 tablet by mouth daily      lisinopril (PRINIVIL;ZESTRIL) 20 MG tablet Take 1 tablet by mouth daily 90 tablet 1    omeprazole (PRILOSEC) 20 MG delayed release capsule Take 1 capsule by mouth daily 90 capsule 1    levothyroxine (SYNTHROID) 50 MCG tablet Take 1 tablet by mouth Daily 90 tablet 1    sertraline (ZOLOFT) 50 MG tablet Take 1 tablet by mouth daily 90 tablet 1    albuterol sulfate HFA (PROVENTIL;VENTOLIN;PROAIR) 108 (90 Base) MCG/ACT inhaler INHALE TWO PUFFS BY MOUTH EVERY 4 HOURS AS NEEDED FOR WHEEZING 18 g 0    ipratropium (ATROVENT HFA) 17 MCG/ACT inhaler Inhale 1 puff into the lungs 3 times daily (Patient not taking: Reported on 1/3/2023) 1 each 3     No current facility-administered medications for this visit.      Allergies   Allergen Reactions    Bromfed Dm [[id number]] Other (See Comments)     jitters     Social History     Tobacco Use    Smoking status: Every Day     Packs/day: 1.00     Types:

## 2023-09-19 ENCOUNTER — TELEPHONE (OUTPATIENT)
Dept: OCCUPATIONAL MEDICINE | Age: 58
End: 2023-09-19

## 2023-09-19 NOTE — TELEPHONE ENCOUNTER
Patient states that she is still experiencing cough and congestion and has been on antibiotics for one week. She would like her antibiotic switched to Doxycycline since her mom and dad were started on that medication and they seem to be doing better. Advised patient to call her PCP to see if she can get in to be evaluated. Patient verbalized understanding.       Pily Bowman NP

## 2023-10-03 ENCOUNTER — TELEPHONE (OUTPATIENT)
Dept: OCCUPATIONAL MEDICINE | Age: 58
End: 2023-10-03

## 2023-10-03 DIAGNOSIS — Z76.0 MEDICATION REFILL: Primary | ICD-10-CM

## 2023-10-03 RX ORDER — LEVOTHYROXINE SODIUM 0.05 MG/1
50 TABLET ORAL DAILY
Qty: 90 TABLET | Refills: 0 | Status: SHIPPED | OUTPATIENT
Start: 2023-10-03

## 2023-10-03 NOTE — TELEPHONE ENCOUNTER
Patient requests refill for synthroid medication. She has been taking this medication for over one year and has not experienced any adverse side effects. She is to follow up with her PCP. Return to clinic as needed.      Claudine Spears NP

## 2023-10-31 ENCOUNTER — TELEPHONE (OUTPATIENT)
Dept: OCCUPATIONAL MEDICINE | Age: 58
End: 2023-10-31

## 2023-10-31 DIAGNOSIS — I10 PRIMARY HYPERTENSION: Primary | ICD-10-CM

## 2023-10-31 DIAGNOSIS — E03.9 HYPOTHYROIDISM, UNSPECIFIED TYPE: ICD-10-CM

## 2023-10-31 DIAGNOSIS — K21.00 GASTROESOPHAGEAL REFLUX DISEASE WITH ESOPHAGITIS WITHOUT HEMORRHAGE: ICD-10-CM

## 2023-10-31 RX ORDER — HYDROCHLOROTHIAZIDE 25 MG/1
25 TABLET ORAL DAILY
Qty: 60 TABLET | Refills: 0 | Status: SHIPPED | OUTPATIENT
Start: 2023-10-31

## 2023-10-31 RX ORDER — OMEPRAZOLE 20 MG/1
20 CAPSULE, DELAYED RELEASE ORAL DAILY
Qty: 60 CAPSULE | Refills: 0 | Status: SHIPPED | OUTPATIENT
Start: 2023-10-31

## 2023-10-31 RX ORDER — LEVOTHYROXINE SODIUM 0.05 MG/1
50 TABLET ORAL DAILY
Qty: 90 TABLET | Refills: 1 | Status: SHIPPED | OUTPATIENT
Start: 2023-10-31

## 2023-10-31 RX ORDER — LISINOPRIL 20 MG/1
20 TABLET ORAL DAILY
Qty: 60 TABLET | Refills: 0 | Status: SHIPPED | OUTPATIENT
Start: 2023-10-31 | End: 2023-12-30

## 2023-10-31 NOTE — TELEPHONE ENCOUNTER
Patient requests refills of medication be sent to preferred pharmacy. Patient has been on these medications and has not experienced any negative side effects. Patient will need to follow up with PCP for next refills. Follow up with PCP as needed.      Luke Cullen NP